# Patient Record
Sex: FEMALE | Race: WHITE | Employment: OTHER | URBAN - METROPOLITAN AREA
[De-identification: names, ages, dates, MRNs, and addresses within clinical notes are randomized per-mention and may not be internally consistent; named-entity substitution may affect disease eponyms.]

---

## 2017-08-14 PROBLEM — S72.142D CLOSED DISPLACED INTERTROCHANTERIC FRACTURE OF LEFT FEMUR WITH ROUTINE HEALING, SUBSEQUENT ENCOUNTER: Status: ACTIVE | Noted: 2017-08-14

## 2017-08-14 PROBLEM — M25.562 ACUTE PAIN OF LEFT KNEE: Status: ACTIVE | Noted: 2017-08-14

## 2017-09-11 PROBLEM — Z96.652 STATUS POST TOTAL LEFT KNEE REPLACEMENT: Status: ACTIVE | Noted: 2017-09-11

## 2019-04-22 ENCOUNTER — HOSPITAL ENCOUNTER (EMERGENCY)
Facility: HOSPITAL | Age: 84
Discharge: HOME | End: 2019-04-22
Attending: EMERGENCY MEDICINE
Payer: MEDICARE

## 2019-04-22 VITALS
SYSTOLIC BLOOD PRESSURE: 149 MMHG | RESPIRATION RATE: 18 BRPM | HEART RATE: 95 BPM | TEMPERATURE: 97 F | OXYGEN SATURATION: 98 % | BODY MASS INDEX: 22.2 KG/M2 | HEIGHT: 64 IN | WEIGHT: 130 LBS | DIASTOLIC BLOOD PRESSURE: 84 MMHG

## 2019-04-22 DIAGNOSIS — N30.00 ACUTE CYSTITIS WITHOUT HEMATURIA: Primary | ICD-10-CM

## 2019-04-22 DIAGNOSIS — I10 HYPERTENSION, UNSPECIFIED TYPE: ICD-10-CM

## 2019-04-22 PROCEDURE — 99284 EMERGENCY DEPT VISIT MOD MDM: CPT

## 2019-04-22 PROCEDURE — 87088 URINE BACTERIA CULTURE: CPT | Performed by: EMERGENCY MEDICINE

## 2019-04-22 PROCEDURE — 96361 HYDRATE IV INFUSION ADD-ON: CPT

## 2019-04-22 PROCEDURE — 87186 SC STD MICRODIL/AGAR DIL: CPT | Performed by: EMERGENCY MEDICINE

## 2019-04-22 PROCEDURE — 85025 COMPLETE CBC W/AUTO DIFF WBC: CPT | Performed by: EMERGENCY MEDICINE

## 2019-04-22 PROCEDURE — 87086 URINE CULTURE/COLONY COUNT: CPT | Performed by: EMERGENCY MEDICINE

## 2019-04-22 PROCEDURE — 81001 URINALYSIS AUTO W/SCOPE: CPT | Performed by: EMERGENCY MEDICINE

## 2019-04-22 PROCEDURE — 96374 THER/PROPH/DIAG INJ IV PUSH: CPT

## 2019-04-22 PROCEDURE — 80053 COMPREHEN METABOLIC PANEL: CPT | Performed by: EMERGENCY MEDICINE

## 2019-04-22 RX ORDER — LISINOPRIL 5 MG/1
5 TABLET ORAL DAILY
Qty: 30 TABLET | Refills: 0 | Status: SHIPPED | OUTPATIENT
Start: 2019-04-22 | End: 2019-07-16

## 2019-04-22 RX ORDER — ONDANSETRON 4 MG/1
4 TABLET, ORALLY DISINTEGRATING ORAL EVERY 4 HOURS PRN
Qty: 10 TABLET | Refills: 0 | Status: SHIPPED | OUTPATIENT
Start: 2019-04-22 | End: 2019-04-29

## 2019-04-22 RX ORDER — SULFAMETHOXAZOLE AND TRIMETHOPRIM 800; 160 MG/1; MG/1
1 TABLET ORAL 2 TIMES DAILY
Qty: 14 TABLET | Refills: 0 | Status: SHIPPED | OUTPATIENT
Start: 2019-04-22 | End: 2019-04-29

## 2019-04-22 RX ORDER — HYDRALAZINE HYDROCHLORIDE 20 MG/ML
10 INJECTION INTRAMUSCULAR; INTRAVENOUS ONCE
Status: COMPLETED | OUTPATIENT
Start: 2019-04-22 | End: 2019-04-22

## 2019-04-22 RX ORDER — SODIUM CHLORIDE 9 MG/ML
INJECTION, SOLUTION INTRAVENOUS CONTINUOUS
Status: DISCONTINUED | OUTPATIENT
Start: 2019-04-22 | End: 2019-04-22

## 2019-04-22 NOTE — ED INITIAL ASSESSMENT (HPI)
Pt from [facility] here with nausea and vomiting which started during noc, sudden onset. No diarrhea. EMS gave zofran in route and pt's nausea now improved.   Arrives alert and oriented

## 2019-04-22 NOTE — ED PROVIDER NOTES
Patient Seen in: [facility] Emergency Department    History   Patient presents with:  Nausea/Vomiting/Diarrhea (gastrointestinal)    Stated Complaint: vomiting    HPI    80-year-old female comes to the hospital complaint of having difficulty with na 04/22/19 0956 Temporal   SpO2 04/22/19 0955 98 %   O2 Device 04/22/19 0955 None (Room air)       Current:/84   Pulse 95   Temp 96.8 °F (36 °C) (Temporal)   Resp 18   Ht 162.6 cm (5' 4\")   Wt 59 kg   SpO2 98%   BMI 22.31 kg/m²         Physical Exam orders.    URINE CULTURE, ROUTINE          Medications   sodium chloride 0.9% IV bolus 500 mL (0 mL Intravenous Stopped 4/22/19 7315)     Followed by   0.9%  NaCl infusion ( Intravenous New Bag 4/22/19 7544)   hydrALAzine HCl (APRESOLINE) injection 10 mg (1

## 2019-04-22 NOTE — ED NOTES
Call placed to [facility] to confirm if pt is taking lisinopril as pt has this listed on her previous med list, but it was not listed on her list form [facility]. Confirmed that pt is not receiving lispinopril.   Dr. Dayana Holland notified

## 2019-04-23 ENCOUNTER — EXTERNAL FACILITY (OUTPATIENT)
Dept: FAMILY MEDICINE CLINIC | Facility: CLINIC | Age: 84
End: 2019-04-23

## 2019-04-23 VITALS
DIASTOLIC BLOOD PRESSURE: 78 MMHG | RESPIRATION RATE: 18 BRPM | OXYGEN SATURATION: 98 % | SYSTOLIC BLOOD PRESSURE: 126 MMHG | TEMPERATURE: 98 F | HEART RATE: 92 BPM

## 2019-04-23 DIAGNOSIS — I10 ESSENTIAL HYPERTENSION, BENIGN: ICD-10-CM

## 2019-04-23 DIAGNOSIS — N30.00 ACUTE CYSTITIS WITHOUT HEMATURIA: Primary | ICD-10-CM

## 2019-04-23 DIAGNOSIS — E11.618 TYPE 2 DIABETES MELLITUS WITH OTHER DIABETIC ARTHROPATHY, WITHOUT LONG-TERM CURRENT USE OF INSULIN (HCC): ICD-10-CM

## 2019-04-23 DIAGNOSIS — N39.41 URGE INCONTINENCE: ICD-10-CM

## 2019-04-23 RX ORDER — POLYETHYLENE GLYCOL 3350 17 G/17G
17 POWDER, FOR SOLUTION ORAL
COMMUNITY
Start: 2019-02-25 | End: 2019-09-11

## 2019-04-23 RX ORDER — ALPRAZOLAM 0.25 MG/1
TABLET ORAL
Refills: 0 | COMMUNITY
Start: 2019-03-07 | End: 2019-05-07

## 2019-04-23 RX ORDER — METRONIDAZOLE 7.5 MG/G
1 GEL TOPICAL
COMMUNITY
Start: 2018-12-27 | End: 2021-02-17 | Stop reason: CLARIF

## 2019-04-23 RX ORDER — FERROUS SULFATE 325(65) MG
325 TABLET ORAL
COMMUNITY
Start: 2019-02-26 | End: 2019-08-20

## 2019-04-23 RX ORDER — TRAMADOL HYDROCHLORIDE 50 MG/1
50 TABLET ORAL EVERY 6 HOURS PRN
Refills: 0 | COMMUNITY
Start: 2019-03-01 | End: 2021-02-17

## 2019-04-23 RX ORDER — DULOXETIN HYDROCHLORIDE 30 MG/1
CAPSULE, DELAYED RELEASE ORAL
COMMUNITY
Start: 2018-12-03 | End: 2019-07-16

## 2019-04-23 NOTE — PROGRESS NOTES
VITALS:  /78   Pulse 92   Temp 97.8 °F (36.6 °C) (Temporal)   Resp 18   SpO2 98%     CC--Patient presents with:  Establish Care  ER F/U: blood pressure       H. P.I Nydia Deleon is a 80year old female who is been seen here for the first time a her crazy, confused    CURRENT MEDICATIONS     Current Outpatient Medications:  denosumab (PROLIA) 60 MG/ML Subcutaneous Solution Inject 60 mg into the skin.  Disp:  Rfl:    Diclofenac Sodium 1 % Transdermal Gel APPLY 4 GRAMS THREE TIMES DAILY TO LEFT KNEE swollen glands. Skin: No rashes, pruritus or skin changes,  Respiratory: Denies cough, wheezing or shortness of breath. CV: Denies chest pain, palpitations, orthopnea, PND or dizziness. Musculoskeletal: No joint pain, stiffness or swelling.   GI: No naus

## 2019-04-25 ENCOUNTER — MED REC SCAN ONLY (OUTPATIENT)
Dept: FAMILY MEDICINE CLINIC | Facility: CLINIC | Age: 84
End: 2019-04-25

## 2019-05-07 ENCOUNTER — EXTERNAL FACILITY (OUTPATIENT)
Dept: FAMILY MEDICINE CLINIC | Facility: CLINIC | Age: 84
End: 2019-05-07

## 2019-05-07 VITALS
OXYGEN SATURATION: 97 % | HEART RATE: 84 BPM | TEMPERATURE: 97 F | SYSTOLIC BLOOD PRESSURE: 118 MMHG | RESPIRATION RATE: 18 BRPM | DIASTOLIC BLOOD PRESSURE: 60 MMHG

## 2019-05-07 DIAGNOSIS — N30.00 ACUTE CYSTITIS WITHOUT HEMATURIA: Primary | ICD-10-CM

## 2019-05-14 NOTE — PROGRESS NOTES
VITALS:  /60   Pulse 84   Temp 96.8 °F (36 °C) (Tympanic)   Resp 18   SpO2 97%     CC--Patient presents with: Follow - Up      H. P.I Fatimah Diaz is a 80year old female   Patient was last seen 2 weeks ago  She was diagnosed with UTI and was 1 CAPSULE BY MOUTH ONCE DAILY Disp:  Rfl:    Ferrous Sulfate 325 (65 Fe) MG Oral Tab Take 325 mg by mouth. Disp:  Rfl:    metroNIDAZOLE 0.75 % External Gel Apply 1 Application topically.  Disp:  Rfl:    Polyethylene Glycol 3350 Oral Powd Pack Take 17 g by m FROM of the back  EXTREMITIES: no cyanosis, clubbing or edema  NEURO: Oriented times three, cranial nerves are intact, motor and sensory are grossly intact    ASSESSMENT AND PLAN:  Diagnoses and all orders for this visit:    Acute cystitis without hematuri

## 2019-06-25 ENCOUNTER — EXTERNAL FACILITY (OUTPATIENT)
Dept: FAMILY MEDICINE CLINIC | Facility: CLINIC | Age: 84
End: 2019-06-25

## 2019-06-25 VITALS
RESPIRATION RATE: 16 BRPM | DIASTOLIC BLOOD PRESSURE: 72 MMHG | HEART RATE: 88 BPM | TEMPERATURE: 97 F | OXYGEN SATURATION: 94 % | SYSTOLIC BLOOD PRESSURE: 118 MMHG

## 2019-06-25 DIAGNOSIS — M25.561 CHRONIC PAIN OF BOTH KNEES: Primary | ICD-10-CM

## 2019-06-25 DIAGNOSIS — M25.562 CHRONIC PAIN OF BOTH KNEES: Primary | ICD-10-CM

## 2019-06-25 DIAGNOSIS — G89.29 CHRONIC PAIN OF BOTH KNEES: Primary | ICD-10-CM

## 2019-06-25 DIAGNOSIS — G47.00 INSOMNIA, UNSPECIFIED TYPE: ICD-10-CM

## 2019-06-25 DIAGNOSIS — Z91.81 AT RISK FOR FALLING: ICD-10-CM

## 2019-06-25 NOTE — PROGRESS NOTES
VITALS:  /72   Pulse 88   Temp 96.8 °F (36 °C) (Temporal)   Resp 16   SpO2 94%     CC--Patient presents with:  Knee Pain: per patient c/o knee pain in both legs       H. P.I Cornelia Dakins is a 80year old female who has history of osteoarthritis 3/21/12    BCC nod / R parietal scalp / Mohs surgery by Dr. Shaina Weston     No family history on file.   Social History    Tobacco Use      Smoking status: Never Smoker      Smokeless tobacco: Never Used    Alcohol use: No      Alcohol/week: 0.0 oz    Drug use: palpitations, orthopnea, PND or dizziness. Musculoskeletal: No joint pain, stiffness or swelling. GI: No nausea, vomiting or diarrhea. No blood in stools. Neurologic: No seizures, tremors, weakness or numbness.     VITALS:  /72   Pulse 88   Temp 96

## 2019-06-26 RX ORDER — ACETAMINOPHEN AND CODEINE PHOSPHATE 300; 30 MG/1; MG/1
1 TABLET ORAL EVERY 4 HOURS PRN
Qty: 40 TABLET | Refills: 0 | Status: SHIPPED | OUTPATIENT
Start: 2019-06-26 | End: 2019-07-23

## 2019-07-16 RX ORDER — ACETAMINOPHEN 325 MG/1
TABLET ORAL
Qty: 180 TABLET | Refills: 0 | Status: SHIPPED | OUTPATIENT
Start: 2019-07-16 | End: 2019-08-13

## 2019-07-16 RX ORDER — LISINOPRIL 5 MG/1
TABLET ORAL
Qty: 90 TABLET | Refills: 0 | Status: SHIPPED | OUTPATIENT
Start: 2019-07-16 | End: 2019-09-16

## 2019-07-16 RX ORDER — MELATONIN
1000 DAILY
Qty: 90 TABLET | Refills: 0 | Status: SHIPPED | OUTPATIENT
Start: 2019-07-16 | End: 2019-09-16

## 2019-07-16 RX ORDER — DULOXETIN HYDROCHLORIDE 30 MG/1
CAPSULE, DELAYED RELEASE ORAL
Qty: 90 CAPSULE | Refills: 0 | Status: SHIPPED | OUTPATIENT
Start: 2019-07-16 | End: 2019-09-16

## 2019-07-22 ENCOUNTER — TELEPHONE (OUTPATIENT)
Dept: FAMILY MEDICINE CLINIC | Facility: CLINIC | Age: 84
End: 2019-07-22

## 2019-07-22 RX ORDER — ATORVASTATIN CALCIUM 40 MG/1
TABLET, FILM COATED ORAL
Qty: 90 TABLET | Refills: 0 | Status: SHIPPED | OUTPATIENT
Start: 2019-07-22 | End: 2019-09-16

## 2019-07-22 NOTE — TELEPHONE ENCOUNTER
Per request of nurse nichole [facility], Rx for Atorvastatin 40 mg nightly sent to [place] [facility]

## 2019-07-23 ENCOUNTER — EXTERNAL FACILITY (OUTPATIENT)
Dept: FAMILY MEDICINE CLINIC | Facility: CLINIC | Age: 84
End: 2019-07-23

## 2019-07-23 VITALS
OXYGEN SATURATION: 97 % | TEMPERATURE: 98 F | SYSTOLIC BLOOD PRESSURE: 130 MMHG | RESPIRATION RATE: 18 BRPM | HEART RATE: 86 BPM | DIASTOLIC BLOOD PRESSURE: 62 MMHG

## 2019-07-23 DIAGNOSIS — G89.29 CHRONIC PAIN OF BOTH KNEES: Primary | ICD-10-CM

## 2019-07-23 DIAGNOSIS — M25.562 CHRONIC PAIN OF BOTH KNEES: Primary | ICD-10-CM

## 2019-07-23 DIAGNOSIS — M25.561 CHRONIC PAIN OF BOTH KNEES: Primary | ICD-10-CM

## 2019-07-23 RX ORDER — BLOOD SUGAR DIAGNOSTIC
STRIP MISCELLANEOUS
Refills: 3 | COMMUNITY
Start: 2019-07-10 | End: 2021-02-17 | Stop reason: CLARIF

## 2019-07-23 RX ORDER — ACETAMINOPHEN AND CODEINE PHOSPHATE 300; 30 MG/1; MG/1
1 TABLET ORAL NIGHTLY
Qty: 30 TABLET | Refills: 0 | Status: SHIPPED | OUTPATIENT
Start: 2019-07-23 | End: 2019-10-14

## 2019-07-23 NOTE — PROGRESS NOTES
VITALS:  /62   Pulse 86   Temp 98 °F (36.7 °C) (Temporal)   Resp 18   SpO2 97%     CC--Patient presents with: Follow - Up: with pain in both knees   Headache      H. P.I Rafat Fuller is a 80year old female who has history of hypertension hype Strip USE 1 STRIP QD.  Disp:  Rfl: 3   atorvastatin 40 MG Oral Tab TAKE ONE TABLET BY MOUTH NIGHTLY Disp: 90 tablet Rfl: 0   lisinopril 5 MG Oral Tab TAKE ONE TABLET BY MOUTH ONCE DAILY Disp: 90 tablet Rfl: 0   DULoxetine HCl 30 MG Oral Cap DR Particles YUAN auscultation  CARDIO: RRR without murmur  GI: good BS's, no masses, HSM or tenderness  MUSCULOSKELETAL: Bilateral knee tenderness  EXTREMITIES: no cyanosis, clubbing or edema  ASSESSMENT AND PLAN:  Diagnoses and all orders for this visit:    Chronic pain o

## 2019-08-01 ENCOUNTER — TELEPHONE (OUTPATIENT)
Dept: FAMILY MEDICINE CLINIC | Facility: CLINIC | Age: 84
End: 2019-08-01

## 2019-08-01 NOTE — TELEPHONE ENCOUNTER
Called [facility] and spoke to Pharmacist who states last night a nurse from [address] [place] [address] called regarding patient's medications. No specific medication was given.      Med list has been reviewed with Dr. Juan Grossman and all medications have been sent to [facility]

## 2019-08-13 RX ORDER — ACETAMINOPHEN 325 MG/1
TABLET ORAL
Qty: 180 TABLET | Refills: 0 | Status: SHIPPED | OUTPATIENT
Start: 2019-08-13 | End: 2019-09-16

## 2019-08-13 NOTE — TELEPHONE ENCOUNTER
Eulalia conklin   acetaminophen 325 MG Oral Tab          Sig: Take 2 tablets (650 mg total) by mouth every 4 hours as needed for pain.     Disp:  180 tablet    Refills:  0    Start: 8/13/2019    Class: Normal    Non-formulary     LOV 7/23/19     LAST LAB n/a

## 2019-08-15 RX ORDER — GABAPENTIN 100 MG/1
100 CAPSULE ORAL 2 TIMES DAILY
Qty: 180 CAPSULE | Refills: 1 | Status: SHIPPED | OUTPATIENT
Start: 2019-08-15 | End: 2019-09-16

## 2019-08-15 NOTE — TELEPHONE ENCOUNTER
GABAPENTIN 100 MG CAPS 03/26/2019 20/19/2635 500 mg 60 applicator 80 30 Tatyana Allen     Per [facility], pt should be on gabapentin BID but NOT on our med list!    Dr Max Priest did you want to order this? Are you supposed to be listed as PCP?     Last

## 2019-08-20 ENCOUNTER — TELEPHONE (OUTPATIENT)
Dept: FAMILY MEDICINE CLINIC | Facility: CLINIC | Age: 84
End: 2019-08-20

## 2019-08-20 RX ORDER — FERROUS SULFATE 325(65) MG
325 TABLET ORAL
Refills: 0 | Status: CANCELLED | OUTPATIENT
Start: 2019-08-20

## 2019-08-20 RX ORDER — ASPIRIN 81 MG
100 TABLET, DELAYED RELEASE (ENTERIC COATED) ORAL 2 TIMES DAILY
Qty: 60 TABLET | Refills: 3 | Status: SHIPPED | OUTPATIENT
Start: 2019-08-20 | End: 2019-09-16

## 2019-08-20 RX ORDER — FERROUS SULFATE 325(65) MG
325 TABLET ORAL
Qty: 30 TABLET | Refills: 3 | Status: SHIPPED | OUTPATIENT
Start: 2019-08-20 | End: 2019-09-16

## 2019-08-20 RX ORDER — GLUCOSAMINE/D3/BOSWELLIA SERRA 1500MG-400
1 TABLET ORAL DAILY
Qty: 30 TABLET | Refills: 3 | Status: SHIPPED | OUTPATIENT
Start: 2019-08-20 | End: 2019-09-16

## 2019-09-03 NOTE — TELEPHONE ENCOUNTER
Diclofenac Sodium 1 % Transdermal Gel          Sig: APPLY 4 GRAMS THREE TIMES DAILY TO LEFT KNEE AND RIGHT SHOULDER    Disp:  Not specified    Refills:  0    Start: 9/3/2019    Class: Normal    Non-formulary    Last ordered: 4 months ago by Reported Extern

## 2019-09-16 RX ORDER — ACETAMINOPHEN 325 MG/1
TABLET ORAL
Qty: 180 TABLET | Refills: 3 | Status: SHIPPED | OUTPATIENT
Start: 2019-09-16 | End: 2020-08-10

## 2019-09-16 RX ORDER — MELATONIN
1000 DAILY
Qty: 90 TABLET | Refills: 3 | Status: SHIPPED | OUTPATIENT
Start: 2019-09-16 | End: 2021-11-22

## 2019-09-16 RX ORDER — MELATONIN
Qty: 90 TABLET | Refills: 3 | Status: SHIPPED | OUTPATIENT
Start: 2019-09-16 | End: 2020-09-14

## 2019-09-16 RX ORDER — GLUCOSAMINE/D3/BOSWELLIA SERRA 1500MG-400
1 TABLET ORAL DAILY
Qty: 90 TABLET | Refills: 3 | Status: SHIPPED | OUTPATIENT
Start: 2019-09-16 | End: 2020-12-29

## 2019-09-16 RX ORDER — GABAPENTIN 100 MG/1
100 CAPSULE ORAL 2 TIMES DAILY
Qty: 180 CAPSULE | Refills: 3 | Status: SHIPPED | OUTPATIENT
Start: 2019-09-16 | End: 2021-11-22

## 2019-09-16 RX ORDER — ONDANSETRON 4 MG/1
4 TABLET, FILM COATED ORAL EVERY 4 HOURS PRN
Qty: 30 TABLET | Refills: 0 | Status: SHIPPED | OUTPATIENT
Start: 2019-09-16

## 2019-09-16 RX ORDER — ASPIRIN 81 MG
100 TABLET, DELAYED RELEASE (ENTERIC COATED) ORAL 2 TIMES DAILY
Qty: 60 TABLET | Refills: 3 | Status: SHIPPED | OUTPATIENT
Start: 2019-09-16 | End: 2020-01-10

## 2019-09-16 RX ORDER — ATORVASTATIN CALCIUM 40 MG/1
TABLET, FILM COATED ORAL
Qty: 90 TABLET | Refills: 3 | Status: SHIPPED | OUTPATIENT
Start: 2019-09-16 | End: 2020-10-30

## 2019-09-16 RX ORDER — MULTIVIT-MIN/FA/LYCOPEN/LUTEIN .4-300-25
1 TABLET ORAL DAILY
Qty: 90 TABLET | Refills: 3 | Status: SHIPPED | OUTPATIENT
Start: 2019-09-16 | End: 2021-03-15

## 2019-09-16 RX ORDER — LISINOPRIL 5 MG/1
TABLET ORAL
Qty: 90 TABLET | Refills: 3 | Status: SHIPPED | OUTPATIENT
Start: 2019-09-16 | End: 2020-07-14

## 2019-09-16 RX ORDER — POLYETHYLENE GLYCOL 3350 17 G/17G
17 POWDER, FOR SOLUTION ORAL DAILY PRN
Qty: 30 EACH | Refills: 3 | Status: SHIPPED | OUTPATIENT
Start: 2019-09-16

## 2019-09-16 RX ORDER — DIPHENHYDRAMINE HCL 25 MG
25 CAPSULE ORAL NIGHTLY PRN
Qty: 30 CAPSULE | Refills: 3 | Status: ON HOLD | OUTPATIENT
Start: 2019-09-16 | End: 2021-02-18

## 2019-09-16 RX ORDER — DULOXETIN HYDROCHLORIDE 30 MG/1
CAPSULE, DELAYED RELEASE ORAL
Qty: 90 CAPSULE | Refills: 3 | Status: SHIPPED | OUTPATIENT
Start: 2019-09-16 | End: 2021-02-17 | Stop reason: CLARIF

## 2019-09-16 RX ORDER — FERROUS SULFATE 325(65) MG
325 TABLET ORAL
Qty: 90 TABLET | Refills: 3 | Status: SHIPPED | OUTPATIENT
Start: 2019-09-16 | End: 2020-09-04

## 2019-09-23 ENCOUNTER — TELEPHONE (OUTPATIENT)
Dept: FAMILY MEDICINE CLINIC | Facility: CLINIC | Age: 84
End: 2019-09-23

## 2019-09-25 ENCOUNTER — MED REC SCAN ONLY (OUTPATIENT)
Dept: FAMILY MEDICINE CLINIC | Facility: CLINIC | Age: 84
End: 2019-09-25

## 2019-10-15 RX ORDER — ACETAMINOPHEN AND CODEINE PHOSPHATE 300; 30 MG/1; MG/1
TABLET ORAL
Qty: 30 TABLET | Refills: 0 | Status: SHIPPED | OUTPATIENT
Start: 2019-10-15 | End: 2019-12-17

## 2019-10-15 NOTE — TELEPHONE ENCOUNTER
LOV 7/19    LAST LAB    LAST RX   Acetaminophen-Codeine (TYLENOL WITH CODEINE #3) 300-30 MG Oral Tab 30 tablet 0 7/23/2019         Next OV    PROTOCOL  Please advise. No protocol. If filled. Please close.    Thank You

## 2019-12-17 RX ORDER — ACETAMINOPHEN AND CODEINE PHOSPHATE 300; 30 MG/1; MG/1
TABLET ORAL
Qty: 30 TABLET | Refills: 0 | Status: SHIPPED | OUTPATIENT
Start: 2019-12-17 | End: 2021-02-17 | Stop reason: CLARIF

## 2019-12-17 NOTE — TELEPHONE ENCOUNTER
Requested Prescriptions     Pending Prescriptions Disp Refills   • Acetaminophen-Codeine #3 300-30 MG Oral Tab 30 tablet 0     Sig: TAKE 1 TABLET BY MOUTH AT BEDTIME

## 2020-01-08 NOTE — TELEPHONE ENCOUNTER
LOV 7/23/19      LAST LAB none    LAST RX 9/16/19 60 tablet 3 refills    Next OV No future appointments.       PROTOCOL none       Name from pharmacy: [address] (DOCUSATE SODIUM) 100MG CAPS         Will file in chart as:  MG Oral Cap         Sig: TAKE 1 TA

## 2020-01-09 RX ORDER — LISINOPRIL 5 MG/1
TABLET ORAL
Qty: 90 TABLET | Refills: 3 | OUTPATIENT
Start: 2020-01-09

## 2020-01-09 NOTE — TELEPHONE ENCOUNTER
LOV 7/19    LAST LAB 4/19    LAST RX   lisinopril 5 MG Oral Tab 90 tablet 3 9/16/2019    Sig:   TAKE ONE TABLET BY MOUTH ONCE DAILY           Next OV Visit date not found      PROTOCOL    Hypertension Medications Protocol Failed1/8 2:43 PM   Appointment in

## 2020-01-10 RX ORDER — DOCUSATE SODIUM 100 MG/1
CAPSULE, LIQUID FILLED ORAL
Qty: 60 CAPSULE | Refills: 0 | Status: SHIPPED | OUTPATIENT
Start: 2020-01-10 | End: 2020-06-11

## 2020-03-04 LAB
AMB EXT CREATININE: 0.84 MG/DL
AMB EXT GFR: 81
AMB EXT GLUCOSE: 88 MG/DL
AMB EXT MALB/CRE CALC: 17.9 UG/MG

## 2020-03-05 ENCOUNTER — TELEPHONE (OUTPATIENT)
Dept: FAMILY MEDICINE CLINIC | Facility: CLINIC | Age: 85
End: 2020-03-05

## 2020-03-09 NOTE — TELEPHONE ENCOUNTER
LOV 7/19    LAST LAB    LAST RX   metFORMIN HCl 500 MG Oral Tab 180 tablet 1 9/11/2019         Next OV Visit date not found      PROTOCOL    Diabetic Medication Protocol Failed3/6 10:07 AM   HgBA1C procedure resulted in past 6 months    Last HgBA1C < 7.5

## 2020-03-30 NOTE — TELEPHONE ENCOUNTER
LOV Visit date not found    LAST LAB none    LAST RX   Menthol, Topical Analgesic, (BIOFREEZE) 4 % External Gel 1 Tube 0 3/11/2020    Sig:   Apply 1 Application topically 3 (three) times daily.  Apply thin film to left knee and right shoulder     Route:   E

## 2020-06-11 RX ORDER — DOCUSATE SODIUM 100 MG/1
CAPSULE, LIQUID FILLED ORAL
Qty: 60 CAPSULE | Refills: 0 | Status: SHIPPED | OUTPATIENT
Start: 2020-06-11 | End: 2020-07-14

## 2020-06-11 NOTE — TELEPHONE ENCOUNTER
LOV Visit date not found    LAST LAB    LAST RX 1-10-20 60*0    Next OV No future appointments.     PROTOCOL   Name from pharmacy: [address] (DOCUSATE SODIUM) 100MG CAPS         Will file in chart as:  MG Oral Cap         Sig: TAKE 1 CAPSULE BY MOUTH TWICE

## 2020-06-15 RX ORDER — CALCIUM CARBONATE/VITAMIN D3 500-3.125
TABLET ORAL
Qty: 60 TABLET | Refills: 0 | Status: SHIPPED | OUTPATIENT
Start: 2020-06-15 | End: 2021-02-17 | Stop reason: CLARIF

## 2020-06-15 NOTE — TELEPHONE ENCOUNTER
LOV Visit date not found    LAST LAB    LAST RX    Next OV No future appointments.     PROTOCOL  Name from pharmacy: OS-EFREM 500 + D3 TABLETS          Will file in chart as: OS-EFREM CALCIUM + D3 500-200 MG-UNIT Oral Tab    The source prescription was reordere

## 2020-06-16 NOTE — TELEPHONE ENCOUNTER
Requested Prescriptions     Pending Prescriptions Disp Refills   • Menthol, Topical Analgesic, (BIOFREEZE) 4 % External Gel 1 Tube 2     Sig: Apply 1 Application topically 3 (three) times daily.  Apply thin film to left knee and right shoulder     Menthol,

## 2020-07-14 RX ORDER — DOCUSATE SODIUM 100 MG/1
100 CAPSULE, LIQUID FILLED ORAL 2 TIMES DAILY
Qty: 60 CAPSULE | Refills: 0 | Status: SHIPPED | OUTPATIENT
Start: 2020-07-14 | End: 2020-08-17

## 2020-07-14 NOTE — TELEPHONE ENCOUNTER
Hypertension Medications Protocol Failed7/14 3:14 PM   Appointment in past 6 or next 3 months    CMP or BMP in past 12 months    Last serum creatinine< 2.0     LOV 7/23/2019      LAST LAB  3/4/20     LAST RX   9/16/19 90 with 3 refills     Next OV No futur

## 2020-07-14 NOTE — TELEPHONE ENCOUNTER
Requested Prescriptions     Pending Prescriptions Disp Refills   • docusate sodium (DOK) 100 MG Oral Cap 60 capsule 0     Sig: Take 1 capsule (100 mg total) by mouth 2 (two) times daily.      LOV 07/23/2019 seen at [address] [facility] E 100 MG Or

## 2020-07-15 RX ORDER — LISINOPRIL 5 MG/1
TABLET ORAL
Qty: 90 TABLET | Refills: 3 | Status: SHIPPED | OUTPATIENT
Start: 2020-07-15 | End: 2021-02-17 | Stop reason: CLARIF

## 2020-08-10 RX ORDER — ACETAMINOPHEN 325 MG/1
TABLET ORAL
Qty: 180 TABLET | Refills: 3 | Status: SHIPPED | OUTPATIENT
Start: 2020-08-10 | End: 2020-11-24

## 2020-08-10 NOTE — TELEPHONE ENCOUNTER
LOV Visit date not found    LAST LAB    LAST RX 9-16-19 180*3    Next OV No future appointments.     PROTOCOL   Name from pharmacy: ACETAMINOPHEN [address]         Will file in chart as: ACETAMINOPHEN 325 MG Oral Tab         Sig: TAKE 2 TABLETS(650 MG)

## 2020-08-17 RX ORDER — DOCUSATE SODIUM 100 MG/1
100 CAPSULE, LIQUID FILLED ORAL 2 TIMES DAILY
Qty: 60 CAPSULE | Refills: 2 | Status: SHIPPED | OUTPATIENT
Start: 2020-08-17 | End: 2021-02-17 | Stop reason: CLARIF

## 2020-08-17 NOTE — TELEPHONE ENCOUNTER
Requested Prescriptions     Pending Prescriptions Disp Refills   • docusate sodium (DOK) 100 MG Oral Cap 60 capsule 2     Sig: Take 1 capsule (100 mg total) by mouth 2 (two) times daily.      Eul[facility]     Protocol none   Please advise

## 2020-09-04 RX ORDER — LIDOCAINE HYDROCHLORIDE 20 MG/ML
SOLUTION ORAL; TOPICAL
Qty: 90 TABLET | Refills: 3 | Status: SHIPPED | OUTPATIENT
Start: 2020-09-04 | End: 2021-02-17 | Stop reason: CLARIF

## 2020-09-04 NOTE — TELEPHONE ENCOUNTER
LOV Visit date not found    LAST LAB    LAST RX 9-16-19 90*3    Next OV No future appointments.     PROTOCOL  Name from pharmacy: FERROUS SULFATE 325MG (5GR) TABS          Will file in chart as: FEROSUL 325 (65 Fe) MG Oral Tab         Sig: TAKE 1 TABLET(325

## 2020-09-14 RX ORDER — MELATONIN
Qty: 120 TABLET | Refills: 0 | Status: SHIPPED | OUTPATIENT
Start: 2020-09-14 | End: 2021-02-17 | Stop reason: CLARIF

## 2020-09-14 NOTE — TELEPHONE ENCOUNTER
Name from pharmacy: [address] [place] [address] Paul Mark Boyle          Will file in chart as: MELATONIN 3 MG Oral Tab         Sig: TAKE 2 TABLETS BY MOUTH AT BEDTIME    Disp:  120 tablet    Refills:  0 (Pharmacy requested: Not specified)    Start: 9/13/2020    Class: Normal

## 2020-09-17 NOTE — TELEPHONE ENCOUNTER
LOV 7/23/2019     LAST LAB 5/3/2019    LAST RX 3/9/2020 180 tablet 1 refill    Next OV No future appointments.       PROTOCOLDiabetic Medication Protocol Failed9/17 9:56 AM   HgBA1C procedure resulted in past 6 months    Last HgBA1C < 7.5    Appointment in

## 2020-10-13 RX ORDER — LISINOPRIL 5 MG/1
TABLET ORAL
Qty: 90 TABLET | Refills: 3 | OUTPATIENT
Start: 2020-10-13

## 2020-10-13 NOTE — TELEPHONE ENCOUNTER
LOV 7-23-19    LAST LAB 4-22-19    LAST RX    Next OV No future appointments.     PROTOCOL    Name from pharmacy: LISINOPRIL 5MG TABLETS          Will file in chart as: LISINOPRIL 5 MG Oral Tab    Sig: TAKE 1 TABLET BY MOUTH EVERY DAY    Disp:  90 tablet

## 2020-10-20 RX ORDER — LISINOPRIL 5 MG/1
TABLET ORAL
Qty: 90 TABLET | Refills: 3 | OUTPATIENT
Start: 2020-10-20

## 2020-10-20 NOTE — TELEPHONE ENCOUNTER
Hypertension Medications Protocol Jzxsrk75/19/2020 02:41 PM   Appointment in past 6 or next 3 months Protocol Details    CMP or BMP in past 12 months     Last serum creatinine< 2.0      LOV   6/25/19 external visit     LAST LAB    LAST RX  7/15/20 90 with

## 2020-10-20 NOTE — TELEPHONE ENCOUNTER
LOV 7/23/2019 ( EXTERNAL FACILITY)    LAST LAB 3/4/20     LAST RX   Medication Quantity Refills Start End   lisinopril 5 MG Oral Tab 90 tablet 3 7/15/2020    Sig:   TAKE ONE TABLET BY MOUTH ONCE DAILY     Route:   (none)     Order #:   [id number]

## 2020-10-21 RX ORDER — LISINOPRIL 5 MG/1
TABLET ORAL
Qty: 90 TABLET | Refills: 3 | OUTPATIENT
Start: 2020-10-21

## 2020-10-30 RX ORDER — ATORVASTATIN CALCIUM 40 MG/1
TABLET, FILM COATED ORAL
Qty: 90 TABLET | Refills: 3 | Status: SHIPPED | OUTPATIENT
Start: 2020-10-30 | End: 2021-02-17 | Stop reason: CLARIF

## 2020-10-30 NOTE — TELEPHONE ENCOUNTER
LOV 7/23/2019    LAST LAB 3/4/2020    LAST RX   atorvastatin 40 MG Oral Tab 90 tablet 3 9/16/2019         Next OV   Future Appointments   Date Time Provider Fátima Bucio   11/6/2020  2:30 PM Andra Alvarez MD [facility] [address]

## 2020-11-24 RX ORDER — ACETAMINOPHEN 325 MG/1
TABLET ORAL
Qty: 180 TABLET | Refills: 3 | Status: SHIPPED | OUTPATIENT
Start: 2020-11-24 | End: 2021-02-17 | Stop reason: CLARIF

## 2020-11-24 NOTE — TELEPHONE ENCOUNTER
LOV 7/23/2019    LAST LAB n/a    LAST RX   acetaminophen 325 MG Oral Tab 180 tablet 3 8/10/2020         Next OV   Future Appointments   Date Time Provider Fátima Rupinder   12/9/2020  2:30 PM Robby Rodriguez MD [facility]         PROTOCOL  n/a

## 2020-12-10 PROBLEM — M70.61 TROCHANTERIC BURSITIS OF RIGHT HIP: Status: ACTIVE | Noted: 2020-12-10

## 2020-12-28 RX ORDER — BIOTIN 10000 MCG
CAPSULE ORAL
Qty: 120 CAPSULE | Refills: 0 | Status: CANCELLED | OUTPATIENT
Start: 2020-12-28

## 2020-12-29 RX ORDER — BIOTIN 10000 MCG
CAPSULE ORAL
Qty: 120 CAPSULE | Refills: 0 | Status: SHIPPED | OUTPATIENT
Start: 2020-12-29 | End: 2021-02-17 | Stop reason: CLARIF

## 2020-12-29 NOTE — TELEPHONE ENCOUNTER
LOV     LAST LAB    LAST RX   Biotin 85936 MCG Oral Tab (Discontinued) 30 tablet 3 8/20/2019         Next OV No future appointments.       PROTOCOL n/a

## 2021-01-07 RX ORDER — BIOTIN 10000 MCG
CAPSULE ORAL
Qty: 120 CAPSULE | Refills: 0 | OUTPATIENT
Start: 2021-01-07

## 2021-01-07 NOTE — TELEPHONE ENCOUNTER
LOV Visit date not found    LAST LAB    LAST RX    Next OV No future appointments.     PROTOCOL  Name from pharmacy: BIOTIN [id number] Lele Carlos [address]          Will file in chart as: BIOTIN 10 MG Oral Cap    Sig: TAKE 1 TABLET BY MOUTH EVERY DAY    Disp:  120 capsul

## 2021-01-11 RX ORDER — BIOTIN 10000 MCG
CAPSULE ORAL
Qty: 120 CAPSULE | Refills: 0 | OUTPATIENT
Start: 2021-01-11

## 2021-01-11 NOTE — TELEPHONE ENCOUNTER
Name from pharmacy: BIOTIN [id number] Lele [address]          Will file in chart as: BIOTIN 10 MG Oral Cap     Possible duplicate: Robb to review recent actions on this medication    Sig: TAKE 1 TABLET BY MOUTH EVERY DAY    Disp:  120 capsule    Refills:  0 (Phar

## 2021-02-17 ENCOUNTER — APPOINTMENT (OUTPATIENT)
Dept: ULTRASOUND IMAGING | Facility: HOSPITAL | Age: 86
End: 2021-02-17
Attending: INTERNAL MEDICINE
Payer: MEDICARE

## 2021-02-17 ENCOUNTER — HOSPITAL ENCOUNTER (OUTPATIENT)
Facility: HOSPITAL | Age: 86
Setting detail: OBSERVATION
Discharge: NURSING, REHAB OR LONG-TERM CARE | End: 2021-02-23
Attending: EMERGENCY MEDICINE | Admitting: HOSPITALIST
Payer: MEDICARE

## 2021-02-17 ENCOUNTER — APPOINTMENT (OUTPATIENT)
Dept: CT IMAGING | Facility: HOSPITAL | Age: 86
End: 2021-02-17
Attending: EMERGENCY MEDICINE
Payer: MEDICARE

## 2021-02-17 ENCOUNTER — APPOINTMENT (OUTPATIENT)
Dept: GENERAL RADIOLOGY | Facility: HOSPITAL | Age: 86
End: 2021-02-17
Attending: EMERGENCY MEDICINE
Payer: MEDICARE

## 2021-02-17 DIAGNOSIS — S00.03XA CONTUSION OF SCALP, INITIAL ENCOUNTER: ICD-10-CM

## 2021-02-17 DIAGNOSIS — I48.92 ATRIAL FLUTTER WITH RAPID VENTRICULAR RESPONSE (HCC): Primary | ICD-10-CM

## 2021-02-17 LAB
ALBUMIN SERPL-MCNC: 3.4 G/DL (ref 3.4–5)
ALBUMIN/GLOB SERPL: 1 {RATIO} (ref 1–2)
ALP LIVER SERPL-CCNC: 92 U/L
ALT SERPL-CCNC: 16 U/L
ANION GAP SERPL CALC-SCNC: 6 MMOL/L (ref 0–18)
APTT PPP: 103.5 SECONDS (ref 25.4–36.1)
APTT PPP: 31.1 SECONDS (ref 25.4–36.1)
AST SERPL-CCNC: 13 U/L (ref 15–37)
ATRIAL RATE: 278 BPM
BASOPHILS # BLD AUTO: 0.05 X10(3) UL (ref 0–0.2)
BASOPHILS NFR BLD AUTO: 0.8 %
BILIRUB SERPL-MCNC: 0.9 MG/DL (ref 0.1–2)
BUN BLD-MCNC: 19 MG/DL (ref 7–18)
BUN/CREAT SERPL: 18.3 (ref 10–20)
CALCIUM BLD-MCNC: 9.4 MG/DL (ref 8.5–10.1)
CHLORIDE SERPL-SCNC: 105 MMOL/L (ref 98–112)
CO2 SERPL-SCNC: 28 MMOL/L (ref 21–32)
CREAT BLD-MCNC: 1.04 MG/DL
DEPRECATED RDW RBC AUTO: 43.1 FL (ref 35.1–46.3)
EOSINOPHIL # BLD AUTO: 0.39 X10(3) UL (ref 0–0.7)
EOSINOPHIL NFR BLD AUTO: 5.9 %
ERYTHROCYTE [DISTWIDTH] IN BLOOD BY AUTOMATED COUNT: 12.4 % (ref 11–15)
EST. AVERAGE GLUCOSE BLD GHB EST-MCNC: 197 MG/DL (ref 68–126)
GLOBULIN PLAS-MCNC: 3.3 G/DL (ref 2.8–4.4)
GLUCOSE BLD-MCNC: 170 MG/DL (ref 70–99)
GLUCOSE BLD-MCNC: 235 MG/DL (ref 70–99)
HAV IGM SER QL: 1.7 MG/DL (ref 1.6–2.6)
HBA1C MFR BLD HPLC: 8.5 % (ref ?–5.7)
HCT VFR BLD AUTO: 35.6 %
HGB BLD-MCNC: 11.8 G/DL
IMM GRANULOCYTES # BLD AUTO: 0.02 X10(3) UL (ref 0–1)
IMM GRANULOCYTES NFR BLD: 0.3 %
INR BLD: 1.04 (ref 0.89–1.11)
LYMPHOCYTES # BLD AUTO: 1.21 X10(3) UL (ref 1–4)
LYMPHOCYTES NFR BLD AUTO: 18.2 %
M PROTEIN MFR SERPL ELPH: 6.7 G/DL (ref 6.4–8.2)
MCH RBC QN AUTO: 31.5 PG (ref 26–34)
MCHC RBC AUTO-ENTMCNC: 33.1 G/DL (ref 31–37)
MCV RBC AUTO: 94.9 FL
MONOCYTES # BLD AUTO: 0.5 X10(3) UL (ref 0.1–1)
MONOCYTES NFR BLD AUTO: 7.5 %
NEUTROPHILS # BLD AUTO: 4.48 X10 (3) UL (ref 1.5–7.7)
NEUTROPHILS # BLD AUTO: 4.48 X10(3) UL (ref 1.5–7.7)
NEUTROPHILS NFR BLD AUTO: 67.3 %
NT-PROBNP SERPL-MCNC: 1820 PG/ML (ref ?–450)
OSMOLALITY SERPL CALC.SUM OF ELEC: 298 MOSM/KG (ref 275–295)
PLATELET # BLD AUTO: 169 10(3)UL (ref 150–450)
POTASSIUM SERPL-SCNC: 3.9 MMOL/L (ref 3.5–5.1)
PSA SERPL DL<=0.01 NG/ML-MCNC: 13.9 SECONDS (ref 12.4–14.6)
Q-T INTERVAL: 342 MS
QRS DURATION: 70 MS
QTC CALCULATION (BEZET): 520 MS
R AXIS: -5 DEGREES
RBC # BLD AUTO: 3.75 X10(6)UL
SARS-COV-2 RNA RESP QL NAA+PROBE: NOT DETECTED
SODIUM SERPL-SCNC: 139 MMOL/L (ref 136–145)
T AXIS: -19 DEGREES
T4 FREE SERPL-MCNC: 1 NG/DL (ref 0.8–1.7)
TROPONIN I SERPL-MCNC: <0.045 NG/ML (ref ?–0.04)
TSI SER-ACNC: 2.11 MIU/ML (ref 0.36–3.74)
TSI SER-ACNC: 2.17 MIU/ML (ref 0.36–3.74)
VENTRICULAR RATE: 139 BPM
WBC # BLD AUTO: 6.7 X10(3) UL (ref 4–11)

## 2021-02-17 PROCEDURE — 73502 X-RAY EXAM HIP UNI 2-3 VIEWS: CPT | Performed by: EMERGENCY MEDICINE

## 2021-02-17 PROCEDURE — 93970 EXTREMITY STUDY: CPT | Performed by: INTERNAL MEDICINE

## 2021-02-17 PROCEDURE — 99204 OFFICE O/P NEW MOD 45 MIN: CPT | Performed by: INTERNAL MEDICINE

## 2021-02-17 PROCEDURE — 72125 CT NECK SPINE W/O DYE: CPT | Performed by: EMERGENCY MEDICINE

## 2021-02-17 PROCEDURE — 99220 INITIAL OBSERVATION CARE,LEVL III: CPT | Performed by: HOSPITALIST

## 2021-02-17 PROCEDURE — 71045 X-RAY EXAM CHEST 1 VIEW: CPT | Performed by: EMERGENCY MEDICINE

## 2021-02-17 PROCEDURE — 70450 CT HEAD/BRAIN W/O DYE: CPT | Performed by: EMERGENCY MEDICINE

## 2021-02-17 RX ORDER — MELATONIN
325
COMMUNITY
End: 2021-11-22

## 2021-02-17 RX ORDER — BIOTIN 10 MG
10 TABLET ORAL DAILY
Status: ON HOLD | COMMUNITY
End: 2021-02-18

## 2021-02-17 RX ORDER — LISINOPRIL 5 MG/1
5 TABLET ORAL DAILY
Status: ON HOLD | COMMUNITY
End: 2021-02-23

## 2021-02-17 RX ORDER — ACETAMINOPHEN 325 MG/1
650 TABLET ORAL EVERY 4 HOURS PRN
COMMUNITY

## 2021-02-17 RX ORDER — ACETAMINOPHEN AND CODEINE PHOSPHATE 300; 30 MG/1; MG/1
1 TABLET ORAL EVERY 6 HOURS PRN
Status: ON HOLD | COMMUNITY
End: 2021-02-18

## 2021-02-17 RX ORDER — ONDANSETRON 2 MG/ML
4 INJECTION INTRAMUSCULAR; INTRAVENOUS EVERY 6 HOURS PRN
Status: DISCONTINUED | OUTPATIENT
Start: 2021-02-17 | End: 2021-02-23

## 2021-02-17 RX ORDER — HEPARIN SODIUM AND DEXTROSE 10000; 5 [USP'U]/100ML; G/100ML
INJECTION INTRAVENOUS CONTINUOUS
Status: DISCONTINUED | OUTPATIENT
Start: 2021-02-17 | End: 2021-02-21

## 2021-02-17 RX ORDER — MELATONIN
3 NIGHTLY
COMMUNITY
End: 2021-11-22

## 2021-02-17 RX ORDER — ATORVASTATIN CALCIUM 40 MG/1
40 TABLET, FILM COATED ORAL NIGHTLY
COMMUNITY
End: 2021-06-16 | Stop reason: DRUGHIGH

## 2021-02-17 RX ORDER — MAGNESIUM OXIDE 400 MG (241.3 MG MAGNESIUM) TABLET
400 TABLET ONCE
Status: COMPLETED | OUTPATIENT
Start: 2021-02-17 | End: 2021-02-17

## 2021-02-17 RX ORDER — HEPARIN SODIUM AND DEXTROSE 10000; 5 [USP'U]/100ML; G/100ML
12 INJECTION INTRAVENOUS ONCE
Status: COMPLETED | OUTPATIENT
Start: 2021-02-17 | End: 2021-02-19

## 2021-02-17 RX ORDER — METOCLOPRAMIDE HYDROCHLORIDE 5 MG/ML
5 INJECTION INTRAMUSCULAR; INTRAVENOUS EVERY 8 HOURS PRN
Status: DISCONTINUED | OUTPATIENT
Start: 2021-02-17 | End: 2021-02-18

## 2021-02-17 RX ORDER — DULOXETIN HYDROCHLORIDE 30 MG/1
30 CAPSULE, DELAYED RELEASE ORAL DAILY
Status: DISCONTINUED | OUTPATIENT
Start: 2021-02-17 | End: 2021-02-23

## 2021-02-17 RX ORDER — TRAMADOL HYDROCHLORIDE 50 MG/1
50 TABLET ORAL EVERY 6 HOURS PRN
Status: DISCONTINUED | OUTPATIENT
Start: 2021-02-17 | End: 2021-02-18

## 2021-02-17 RX ORDER — MELATONIN
1000 DAILY
Status: DISCONTINUED | OUTPATIENT
Start: 2021-02-18 | End: 2021-02-23

## 2021-02-17 RX ORDER — DOCUSATE SODIUM 100 MG/1
100 CAPSULE, LIQUID FILLED ORAL EVERY OTHER DAY
COMMUNITY
End: 2021-11-22

## 2021-02-17 RX ORDER — ASPIRIN 81 MG/1
TABLET, CHEWABLE ORAL
Status: DISPENSED
Start: 2021-02-17 | End: 2021-02-18

## 2021-02-17 RX ORDER — IVERMECTIN 10 MG/G
1 CREAM TOPICAL NIGHTLY
COMMUNITY

## 2021-02-17 RX ORDER — MELATONIN
325
Status: DISCONTINUED | OUTPATIENT
Start: 2021-02-18 | End: 2021-02-23

## 2021-02-17 RX ORDER — ASPIRIN 81 MG/1
81 TABLET, CHEWABLE ORAL DAILY
Status: DISCONTINUED | OUTPATIENT
Start: 2021-02-18 | End: 2021-02-23

## 2021-02-17 RX ORDER — DULOXETIN HYDROCHLORIDE 30 MG/1
30 CAPSULE, DELAYED RELEASE ORAL DAILY
COMMUNITY
End: 2021-11-10

## 2021-02-17 RX ORDER — GABAPENTIN 100 MG/1
100 CAPSULE ORAL 2 TIMES DAILY
Status: DISCONTINUED | OUTPATIENT
Start: 2021-02-17 | End: 2021-02-23

## 2021-02-17 RX ORDER — ATORVASTATIN CALCIUM 40 MG/1
40 TABLET, FILM COATED ORAL NIGHTLY
Status: DISCONTINUED | OUTPATIENT
Start: 2021-02-17 | End: 2021-02-23

## 2021-02-17 RX ORDER — VIT A/VIT C/VIT E/ZINC/COPPER 2148-113
1 TABLET ORAL 2 TIMES DAILY
COMMUNITY

## 2021-02-17 RX ORDER — DEXTROSE MONOHYDRATE 25 G/50ML
50 INJECTION, SOLUTION INTRAVENOUS
Status: DISCONTINUED | OUTPATIENT
Start: 2021-02-17 | End: 2021-02-23

## 2021-02-17 RX ORDER — HEPARIN SODIUM 5000 [USP'U]/ML
60 INJECTION INTRAVENOUS; SUBCUTANEOUS ONCE
Status: COMPLETED | OUTPATIENT
Start: 2021-02-17 | End: 2021-02-17

## 2021-02-17 RX ORDER — DOCUSATE SODIUM 100 MG/1
100 CAPSULE, LIQUID FILLED ORAL 2 TIMES DAILY
Status: DISCONTINUED | OUTPATIENT
Start: 2021-02-17 | End: 2021-02-18

## 2021-02-17 RX ORDER — LISINOPRIL 5 MG/1
5 TABLET ORAL DAILY
Status: DISCONTINUED | OUTPATIENT
Start: 2021-02-18 | End: 2021-02-20

## 2021-02-17 RX ORDER — CALCIUM CARBONATE/VITAMIN D3 250-3.125
1 TABLET ORAL 2 TIMES DAILY
Status: DISCONTINUED | OUTPATIENT
Start: 2021-02-17 | End: 2021-02-23

## 2021-02-17 RX ORDER — MELATONIN
3 NIGHTLY
Status: DISCONTINUED | OUTPATIENT
Start: 2021-02-17 | End: 2021-02-23

## 2021-02-17 RX ORDER — POLYETHYLENE GLYCOL 3350 17 G/17G
17 POWDER, FOR SOLUTION ORAL DAILY PRN
Status: DISCONTINUED | OUTPATIENT
Start: 2021-02-17 | End: 2021-02-23

## 2021-02-17 NOTE — ED INITIAL ASSESSMENT (HPI)
A/o x3, on RA, from [facility], Pt reports falling after not locking her wheelchair\"Getting off the toilet going to wheelchair and I fell,\" did hit her head. Per EMS appeared a-fib in the 12 lead.

## 2021-02-17 NOTE — ED NOTES
Returned from CT with pt and monitor. Straight cath per ER MD orders with no urine return at this time.

## 2021-02-17 NOTE — CONSULTS
[facility] Consult Note    Mk Ross Patient Status:  Emergency    10/30/1931 MRN [id number]   Location [address] Attending Francine Nguyễn, [address] [facility] Day # 0 PCP Nathan Garcia MD     HPI:  66-year-old female with BP improved  - Discussed with patient     Mary DILLONS/[facility]    --------------------------------------------------------------------------------------------------------------------------------  ROS 10 systems reviewed, pertinent findings ab

## 2021-02-17 NOTE — ED PROVIDER NOTES
Patient Seen in: [facility] Emergency Department      History   Patient presents with:  Syncope    Stated Complaint: Syncope    HPI/Subjective:   HPI    77-year-old female with history of hypertension, high cholesterol, diabetes mellitus presents em Mucous membranes are slightly dry, oropharynx is clear, uvula midline. Mild tenderness of the posterior scalp. NECK: Cervical collar in place, no midline cervical spine tenderness or step-off. HEART: Tachycardic rate, regular rhythm. No murmur.     LUNG -----------         ------                     CBC W/ DIFFERENTIAL[[id number]]          Abnormal            Final result                 Please view results for these tests on the individual orders.    URINALYSIS WITH CULTURE REFLEX   RUKHSANA PALMA RAI encounter    Disposition:  There is no disposition on file for this visit. There is no disposition time on file for this visit. Follow-up:  No follow-up provider specified.         Medications Prescribed:  Current Discharge Medication List

## 2021-02-17 NOTE — H&P
[facility]  History and Physical     Peterall Jung Patient Status:  Emergency    10/30/1931 MRN [id number]   Location [address] [facility] Attending Shay Odell, [address] [facility] Day # 0 PCP Joelle Lara MD     Chief Complaint: f capsule, Rfl: 0    •  acetaminophen 325 MG Oral Tab, TAKE 2 TABLETS(650 MG) BY MOUTH EVERY 4 HOURS AS NEEDED FOR PAIN, Disp: 180 tablet, Rfl: 3    •  atorvastatin 40 MG Oral Tab, TAKE ONE TABLET BY MOUTH NIGHTLY, Disp: 90 tablet, Rfl: 3    •  metFORMIN HCl Ondansetron HCl (ZOFRAN) 4 mg tablet, Take 1 tablet (4 mg total) by mouth every 4 (four) hours as needed for Nausea., Disp: 30 tablet, Rfl: 0    •  Glucose Blood (PRODIGY NO CODING BLOOD GLUC) In Vitro Strip, USE 1 STRIP QD., Disp: , Rfl: 3    •  denosuma Clearance: 30.3 mL/min (A) (based on SCr of 1.04 mg/dL (H)). Recent Labs   Lab 02/17/21  1111   PTP 13.9   INR 1.04       Recent Labs   Lab 02/17/21  1111   TROP <0.045       Imaging: Imaging data reviewed in Epic. ASSESSMENT / PLAN:     1. ABBY Vasques

## 2021-02-18 ENCOUNTER — APPOINTMENT (OUTPATIENT)
Dept: CV DIAGNOSTICS | Facility: HOSPITAL | Age: 86
End: 2021-02-18
Attending: HOSPITALIST
Payer: MEDICARE

## 2021-02-18 LAB
APTT PPP: 48 SECONDS (ref 25.4–36.1)
APTT PPP: 52.5 SECONDS (ref 25.4–36.1)
APTT PPP: 59.8 SECONDS (ref 25.4–36.1)
BILIRUB UR QL STRIP.AUTO: NEGATIVE
CLARITY UR REFRACT.AUTO: CLEAR
GLUCOSE BLD-MCNC: 152 MG/DL (ref 70–99)
GLUCOSE BLD-MCNC: 173 MG/DL (ref 70–99)
GLUCOSE BLD-MCNC: 192 MG/DL (ref 70–99)
GLUCOSE BLD-MCNC: 194 MG/DL (ref 70–99)
GLUCOSE UR STRIP.AUTO-MCNC: NEGATIVE MG/DL
HAV IGM SER QL: 1.7 MG/DL (ref 1.6–2.6)
KETONES UR STRIP.AUTO-MCNC: 20 MG/DL
NITRITE UR QL STRIP.AUTO: NEGATIVE
PH UR STRIP.AUTO: 8 [PH] (ref 4.5–8)
PROT UR STRIP.AUTO-MCNC: NEGATIVE MG/DL
RBC UR QL AUTO: NEGATIVE
SP GR UR STRIP.AUTO: 1.01 (ref 1–1.03)
TROPONIN I SERPL-MCNC: <0.045 NG/ML (ref ?–0.04)
UROBILINOGEN UR STRIP.AUTO-MCNC: <2 MG/DL

## 2021-02-18 PROCEDURE — 93306 TTE W/DOPPLER COMPLETE: CPT | Performed by: HOSPITALIST

## 2021-02-18 PROCEDURE — 99214 OFFICE O/P EST MOD 30 MIN: CPT | Performed by: INTERNAL MEDICINE

## 2021-02-18 PROCEDURE — 99226 SUBSEQUENT OBSERVATION CARE: CPT | Performed by: HOSPITALIST

## 2021-02-18 RX ORDER — METOPROLOL TARTRATE 50 MG/1
50 TABLET, FILM COATED ORAL
Status: DISCONTINUED | OUTPATIENT
Start: 2021-02-18 | End: 2021-02-23

## 2021-02-18 RX ORDER — MAGNESIUM OXIDE 400 MG (241.3 MG MAGNESIUM) TABLET
400 TABLET ONCE
Status: COMPLETED | OUTPATIENT
Start: 2021-02-18 | End: 2021-02-18

## 2021-02-18 RX ORDER — ACETAMINOPHEN 325 MG/1
650 TABLET ORAL EVERY 4 HOURS PRN
Status: DISCONTINUED | OUTPATIENT
Start: 2021-02-18 | End: 2021-02-23

## 2021-02-18 RX ORDER — WARFARIN SODIUM 5 MG/1
5 TABLET ORAL
Status: COMPLETED | OUTPATIENT
Start: 2021-02-18 | End: 2021-02-18

## 2021-02-18 RX ORDER — METOPROLOL TARTRATE 5 MG/5ML
5 INJECTION INTRAVENOUS EVERY 4 HOURS PRN
Status: DISCONTINUED | OUTPATIENT
Start: 2021-02-18 | End: 2021-02-21

## 2021-02-18 RX ORDER — HALOPERIDOL 5 MG/ML
2 INJECTION INTRAMUSCULAR EVERY 6 HOURS PRN
Status: DISCONTINUED | OUTPATIENT
Start: 2021-02-18 | End: 2021-02-23

## 2021-02-18 NOTE — PLAN OF CARE
Assumed care this am   Pt alert 1-2. Seems to remember where she is and why she is in the hospital this mornings. Towards the evening became more forgetful and agitated. Was trying to climb out of bed and pulling at IV.  Haldol PRN ordered, and seroquel nig

## 2021-02-18 NOTE — PROGRESS NOTES
[facility]  Progress Note     Omar Patch Patient Status:  Observation    10/30/1931 MRN [id number]   [facility] 7NE-A Attending Zbigniew Cody MD   Hosp Day # 0 PCP Radha Dong MD     Chief Complaint: a flutter    S: Patie reviewed in Epic.     Medications:   • magnesium oxide  400 mg Oral Once   • metoprolol Tartrate  50 mg Oral 2x Daily(Beta Blocker)   • aspirin  81 mg Oral Daily   • DULoxetine HCl  30 mg Oral Daily   • gabapentin  100 mg Oral BID   • Calcium Carbonate-Kathy

## 2021-02-18 NOTE — CM/SW NOTE
Pt is an 81 yo female admitted for aflutter. Pt fell at home. Pt lives at [facility]. Per RN at rounds, pt will most likely to return to [facility]. Eliquis price checked for pt - cost will be $485.   Pt can get a 30 day free coupon but

## 2021-02-18 NOTE — PROGRESS NOTES
[facility]  Cardiology Progress Note    Subjective:  No chest pain or shortness of breath.      Objective:  /81 (BP Location: Right arm)   Pulse 86   Temp 97.9 °F (36.6 °C) (Oral)   Resp 15   Ht 5' 3\" (1.6 m)   Wt 132 lb 4.4 oz (60 kg)   SpO2 95 eliquis and stop heparin gtts. · Echo pending    JENNIFER Truong  2/18/2021  9:32 AM    Attending addendum:    S: Patient denies any chest pain or shortness of breath. She also denies any palpitations.      O:  Hemodynamics: Stable, HR improved  PE:

## 2021-02-18 NOTE — PLAN OF CARE
Assumed care of pt. @ 1800  A&Ox4. Atrial flutter. Rhythm controlled. Asymptomatic. No acute neuro changes. Baseline bilateral lower extremity edema noted. RLE 4/5 strength (edema). Dc'd Cardizem per protocol. HR in 60s.    Heparin infusing at 7ml/h

## 2021-02-18 NOTE — PLAN OF CARE
Assumed care at 1. C/o L hip pain, declined pain medication. Heparin gtt infusing per protocol. BLE US completed this evening. Video monitoring in place.       Problem: SAFETY ADULT - FALL  Goal: Free from fall injury  Description: INTERVENTIONS:  -

## 2021-02-19 ENCOUNTER — APPOINTMENT (OUTPATIENT)
Dept: CT IMAGING | Facility: HOSPITAL | Age: 86
End: 2021-02-19
Attending: INTERNAL MEDICINE
Payer: MEDICARE

## 2021-02-19 LAB
APTT PPP: 59 SECONDS (ref 25.4–36.1)
ATRIAL RATE: 267 BPM
GLUCOSE BLD-MCNC: 143 MG/DL (ref 70–99)
GLUCOSE BLD-MCNC: 160 MG/DL (ref 70–99)
GLUCOSE BLD-MCNC: 174 MG/DL (ref 70–99)
GLUCOSE BLD-MCNC: 243 MG/DL (ref 70–99)
HAV IGM SER QL: 1.7 MG/DL (ref 1.6–2.6)
INR BLD: 1.1 (ref 0.89–1.11)
P AXIS: 48 DEGREES
PSA SERPL DL<=0.01 NG/ML-MCNC: 14.5 SECONDS (ref 12.4–14.6)
Q-T INTERVAL: 300 MS
QRS DURATION: 82 MS
QTC CALCULATION (BEZET): 408 MS
R AXIS: 3 DEGREES
T AXIS: -54 DEGREES
VENTRICULAR RATE: 111 BPM

## 2021-02-19 PROCEDURE — 99214 OFFICE O/P EST MOD 30 MIN: CPT | Performed by: NURSE PRACTITIONER

## 2021-02-19 PROCEDURE — 70450 CT HEAD/BRAIN W/O DYE: CPT | Performed by: INTERNAL MEDICINE

## 2021-02-19 PROCEDURE — 99226 SUBSEQUENT OBSERVATION CARE: CPT | Performed by: HOSPITALIST

## 2021-02-19 RX ORDER — DILTIAZEM HYDROCHLORIDE 180 MG/1
180 CAPSULE, EXTENDED RELEASE ORAL DAILY
Status: DISCONTINUED | OUTPATIENT
Start: 2021-02-19 | End: 2021-02-19

## 2021-02-19 RX ORDER — MAGNESIUM OXIDE 400 MG (241.3 MG MAGNESIUM) TABLET
400 TABLET ONCE
Status: DISCONTINUED | OUTPATIENT
Start: 2021-02-19 | End: 2021-02-21

## 2021-02-19 RX ORDER — WARFARIN SODIUM 5 MG/1
5 TABLET ORAL
Status: COMPLETED | OUTPATIENT
Start: 2021-02-19 | End: 2021-02-19

## 2021-02-19 RX ORDER — WARFARIN SODIUM 5 MG/1
5 TABLET ORAL NIGHTLY
Qty: 30 TABLET | Refills: 3 | Status: SHIPPED | OUTPATIENT
Start: 2021-02-19 | End: 2021-02-23

## 2021-02-19 RX ORDER — METOPROLOL TARTRATE 50 MG/1
50 TABLET, FILM COATED ORAL
Qty: 60 TABLET | Refills: 3 | Status: SHIPPED | OUTPATIENT
Start: 2021-02-19 | End: 2021-02-23

## 2021-02-19 RX ORDER — DILTIAZEM HYDROCHLORIDE 60 MG/1
60 TABLET, FILM COATED ORAL EVERY 6 HOURS SCHEDULED
Status: DISCONTINUED | OUTPATIENT
Start: 2021-02-19 | End: 2021-02-20

## 2021-02-19 NOTE — PLAN OF CARE
Assumed care. Patient confused/agitated. Trying to get OOB. HR elevated. Cardiology notified. Prn metoprolol ordered/given for HR sustained >130. Not really effective as when patient is agitated, HR is elevated. When patient is sleeping, HR is 80-90's.  Ignacio Piña Evaluate effectiveness of antiarrhythmic and heart rate control medications as ordered  - Initiate emergency measures for life threatening arrhythmias  - Monitor electrolytes and administer replacement therapy as ordered  Outcome: Progressing

## 2021-02-19 NOTE — DIETARY NOTE
[address] [facility]     Admitting diagnosis:  Atrial flutter with rapid ventricular response ([address] [I48.92]  Contusion of scalp, initial encounter [S00.03XA]    Ht: 160 cm (5' 3\")  Wt: 60 kg (132 lb 4.4 oz).    Body

## 2021-02-19 NOTE — PROGRESS NOTES
[facility]  Progress Note     Fatimah Diaz Patient Status:  Observation    10/30/1931 MRN [id number]   [facility] 7NE-A Attending Elisa Booth MD   Hosp Day # 0 PCP Calos Barnes MD     Chief Complaint: a flutter    S: Patie magnesium oxide  400 mg Oral Once   • dilTIAZem  180 mg Oral Daily   • metoprolol Tartrate  50 mg Oral 2x Daily(Beta Blocker)   • QUEtiapine Fumarate  12.5 mg Oral Nightly   • aspirin  81 mg Oral Daily   • DULoxetine HCl  30 mg Oral Daily   • gabapentin  1

## 2021-02-19 NOTE — PHYSICAL THERAPY NOTE
PT orders received and chart reviewed. Per discussion with RN pt is tachycardic this morning and a stat CT has been ordered for increased confusion. Asked to hold until afternoon. PT spoke with pt's son to obtain history and prior level of function.  Pt

## 2021-02-19 NOTE — PROGRESS NOTES
[facility]  Cardiology Progress Note    Subjective:  No chest pain or shortness of breath.     Objective:  /89 (BP Location: Right arm)   Pulse 104   Temp 98.2 °F (36.8 °C) (Oral)   Resp 14   Ht 5' 3\" (1.6 m)   Wt 132 lb 4.4 oz (60 kg)   SpO2 95

## 2021-02-20 LAB
APTT PPP: 82 SECONDS (ref 25.4–36.1)
GLUCOSE BLD-MCNC: 177 MG/DL (ref 70–99)
GLUCOSE BLD-MCNC: 193 MG/DL (ref 70–99)
GLUCOSE BLD-MCNC: 215 MG/DL (ref 70–99)
HAV IGM SER QL: 1.8 MG/DL (ref 1.6–2.6)
INR BLD: 1.46 (ref 0.89–1.11)
PSA SERPL DL<=0.01 NG/ML-MCNC: 18.1 SECONDS (ref 12.4–14.6)

## 2021-02-20 PROCEDURE — 99214 OFFICE O/P EST MOD 30 MIN: CPT | Performed by: INTERNAL MEDICINE

## 2021-02-20 PROCEDURE — 99226 SUBSEQUENT OBSERVATION CARE: CPT | Performed by: HOSPITALIST

## 2021-02-20 RX ORDER — DILTIAZEM HYDROCHLORIDE 240 MG/1
240 CAPSULE, COATED, EXTENDED RELEASE ORAL DAILY
Status: DISCONTINUED | OUTPATIENT
Start: 2021-02-20 | End: 2021-02-20

## 2021-02-20 RX ORDER — WARFARIN SODIUM 5 MG/1
5 TABLET ORAL
Status: COMPLETED | OUTPATIENT
Start: 2021-02-20 | End: 2021-02-20

## 2021-02-20 RX ORDER — DILTIAZEM HYDROCHLORIDE 120 MG/1
120 CAPSULE, EXTENDED RELEASE ORAL 2 TIMES DAILY
Status: DISCONTINUED | OUTPATIENT
Start: 2021-02-20 | End: 2021-02-23

## 2021-02-20 RX ORDER — MAGNESIUM OXIDE 400 MG (241.3 MG MAGNESIUM) TABLET
400 TABLET ONCE
Status: DISCONTINUED | OUTPATIENT
Start: 2021-02-20 | End: 2021-02-21

## 2021-02-20 RX ORDER — FUROSEMIDE 10 MG/ML
20 INJECTION INTRAMUSCULAR; INTRAVENOUS ONCE
Status: COMPLETED | OUTPATIENT
Start: 2021-02-20 | End: 2021-02-20

## 2021-02-20 NOTE — PLAN OF CARE
Assumed care at 0700  Patient drowsy this am. More alert this afternoon  Confused this am. Oriented x4 this evening  HR 130s at rest. PO cardizem added  Rates better controlled this evening 80s-90s  PT to see tomorrow d/t hr today  Heparin drip infusing  C

## 2021-02-20 NOTE — PLAN OF CARE
Assumed care. Patient drowsy tonight but not as confused. Oriented to person and place, situation. IV heparin infusing per protocol. Coumadin 5mg given tonight. HR better controlled tonight after adding po cardizem. No other issues overnight. Will monitor.

## 2021-02-20 NOTE — PHYSICAL THERAPY NOTE
PHYSICAL THERAPY EVALUATION - INPATIENT     Room Number: 0738/2502-O  Evaluation Date: 2/20/2021  Type of Evaluation: Initial  Physician Order: PT Eval and Treat    Presenting Problem: s/p fall, afib  Reason for Therapy: Mobility Dysfunction and Disc certain about this. Pt has poor safety awareness as she often stand up out of her w/c at home. Per pt reports, pt able to dress herself, and is able to complete bed mobility and transfers without assistance.     SUBJECTIVE  \"I can dress myself and get in AM-PAC Score:  Raw Score: 16   Approx Degree of Impairment: 54.16%   Standardized Score (AM-PAC Scale): 40.78   CMS Modifier (G-Code): CK    FUNCTIONAL ABILITY STATUS  Gait Assessment   Gait Assistance:  Moderate assistance  Distance (ft): 1  Assistiv functional limitations in independent bed mobility, transfers, and gait. The patient is below baseline and would benefit from skilled inpatient PT to address the above deficits to assist patient in returning to prior to level of function.   DISCHARGE RECOM

## 2021-02-20 NOTE — PROGRESS NOTES
[facility]  Progress Note     Bonnie Borja Patient Status:  Observation    10/30/1931 MRN [id number]   [facility] 7NE-A Attending Josy Godoy MD   Hosp Day # 0 PCP Grecia Saldana MD     Chief Complaint: a flutter    S: Patie hours.    Imaging: Imaging data reviewed in Epic.     Medications:   • magnesium oxide  400 mg Oral Once   • dilTIAZem  240 mg Oral Daily   • magnesium oxide  400 mg Oral Once   • metoprolol Tartrate  50 mg Oral 2x Daily(Beta Blocker)   • QUEtiapine Fumarat

## 2021-02-20 NOTE — PROGRESS NOTES
[facility]  Cardiology Progress Note    Subjective:  No chest pain or shortness of breath. Afib/flutter rates better. INR rising. Less confused.     Objective:  /60   Pulse 78   Temp 98.3 °F (36.8 °C) (Oral)   Resp 14   Ht 5' 3\" (1.6 m)   Wt 50mm Hg to 55mm Hg. Estimated pulmonary artery diastolic      pressure was 18mm Hg. 8. Pericardium, extracardiac: A trivial pericardial effusion was identified.      There was no evidence of hemodynamic compromise.      Impressions:  This study is compare

## 2021-02-21 LAB
APTT PPP: 48.9 SECONDS (ref 25.4–36.1)
APTT PPP: 92 SECONDS (ref 25.4–36.1)
CREAT BLD-MCNC: 1.35 MG/DL
GLUCOSE BLD-MCNC: 183 MG/DL (ref 70–99)
GLUCOSE BLD-MCNC: 194 MG/DL (ref 70–99)
GLUCOSE BLD-MCNC: 237 MG/DL (ref 70–99)
HAV IGM SER QL: 1.8 MG/DL (ref 1.6–2.6)
INR BLD: 2.13 (ref 0.89–1.11)
PSA SERPL DL<=0.01 NG/ML-MCNC: 24.4 SECONDS (ref 12.4–14.6)

## 2021-02-21 PROCEDURE — 99214 OFFICE O/P EST MOD 30 MIN: CPT | Performed by: INTERNAL MEDICINE

## 2021-02-21 PROCEDURE — 99225 SUBSEQUENT OBSERVATION CARE: CPT | Performed by: HOSPITALIST

## 2021-02-21 RX ORDER — MAGNESIUM OXIDE 400 MG (241.3 MG MAGNESIUM) TABLET
400 TABLET ONCE
Status: COMPLETED | OUTPATIENT
Start: 2021-02-21 | End: 2021-02-21

## 2021-02-21 RX ORDER — METOPROLOL TARTRATE 5 MG/5ML
5 INJECTION INTRAVENOUS EVERY 4 HOURS PRN
Status: DISCONTINUED | OUTPATIENT
Start: 2021-02-21 | End: 2021-02-23

## 2021-02-21 NOTE — PROGRESS NOTES
[facility]  Cardiology Progress Note    Subjective:  No chest pain or shortness of breath. Very weak. Daughter at bedside. Patient has not been up today except to chair and is still using Purewick.     Objective:  /63 (BP Location: R Hg to 55mm Hg. Estimated pulmonary artery diastolic      pressure was 18mm Hg. 8. Pericardium, extracardiac: A trivial pericardial effusion was identified.      There was no evidence of hemodynamic compromise.      Impressions:  This study is compared wit assessment and plan. Lluvia Ross.  Miriam Worley MD

## 2021-02-21 NOTE — PLAN OF CARE
Assumed care. No new issues overnight. HR's a little higher overnight d/t lopressor being held for lower BP. Patient currently resting in bed. Mentation better. Anticipate discharge soon.

## 2021-02-21 NOTE — PHYSICAL THERAPY NOTE
Duplicate order noted. Will continue to follow pt. Pt has follow up date for PT of 2/22/21. Will re-assess at that time. Thank you.

## 2021-02-21 NOTE — PLAN OF CARE
Assumed care at 0700  Patient alert, oriented x4. Drowsy, forgetful at times  Worked with PT.  Up to chair  PT recommending  PT  Low bp this am. meds adjusted  IV lasix x1  Better appetite today    Possible dc tomorrow  Plan of care discussed with patient

## 2021-02-21 NOTE — PROGRESS NOTES
[facility] HOSPITALIST  Progress Note     Itzel Max Patient Status:  Observation    10/30/1931 MRN [id number]   [facility] 7NE-A Attending Amada Keller MD   Hosp Day # 0 PCP Candie Infante MD     Chief Complaint: a flutter    S: Patie results for input(s): CK in the last 168 hours. Inflammatory Markers  No results for input(s): CRP, FERNANDO, LDH, DDIMER in the last 168 hours. Imaging: Imaging data reviewed in Epic.     Medications:   • dilTIAZem  120 mg Oral BID   • metoprolol Tartrate

## 2021-02-22 LAB
GLUCOSE BLD-MCNC: 190 MG/DL (ref 70–99)
GLUCOSE BLD-MCNC: 220 MG/DL (ref 70–99)
GLUCOSE BLD-MCNC: 246 MG/DL (ref 70–99)
GLUCOSE BLD-MCNC: 287 MG/DL (ref 70–99)
GLUCOSE BLD-MCNC: 308 MG/DL (ref 70–99)
GLUCOSE BLD-MCNC: 414 MG/DL (ref 70–99)
HAV IGM SER QL: 1.9 MG/DL (ref 1.6–2.6)
INR BLD: 2.2 (ref 0.89–1.11)
PSA SERPL DL<=0.01 NG/ML-MCNC: 25 SECONDS (ref 12.4–14.6)

## 2021-02-22 PROCEDURE — 99225 SUBSEQUENT OBSERVATION CARE: CPT | Performed by: HOSPITALIST

## 2021-02-22 PROCEDURE — 99214 OFFICE O/P EST MOD 30 MIN: CPT | Performed by: NURSE PRACTITIONER

## 2021-02-22 RX ORDER — DILTIAZEM HYDROCHLORIDE 120 MG/1
120 CAPSULE, EXTENDED RELEASE ORAL 2 TIMES DAILY
Qty: 60 CAPSULE | Refills: 5 | Status: SHIPPED | OUTPATIENT
Start: 2021-02-22 | End: 2021-02-23

## 2021-02-22 NOTE — CM/SW NOTE
Per PT, pt will need assistance for transfers at [facility] which dtr found out they can provide. PT is also recommending HHPT. Will send referral to MK Sutherland who provides threapy at [facility]. Referral sent via Aidin - waiting for a response.

## 2021-02-22 NOTE — PHYSICAL THERAPY NOTE
PHYSICAL THERAPY TREATMENT NOTE - INPATIENT    Room Number: 6085/5693-B     Session: 1   Number of Visits to Meet Established Goals: 5    Presenting Problem: s/p fall, afib    History related to current admission: pt admitted to Ed from [facility] Standing: Poor +  Dynamic Standing: Poor +    ACTIVITY TOLERANCE   Pt tolerated activity well, no c/o dizziness. Vitals stable.          BP: 107/80  BP Location: Right arm  BP Method: Automatic  Patient Position: Sitting    O2 WALK              AM-PAC '6-Cl sit to/from stand transfers. On the first stand pt stood for 20 seconds, second stand pt marched in place, third and fourth stands pt took several steps forward and back from the chair, and fifth and sixth stands pt ambulated to/from bed to chair.  Pt ambul assistance level: supervision      Goal #3 Patient is able to ambulate 5 feet with assist device: walker - rolling at assistance level: minimum assistance      Goal #4     Goal #5     Goal #6     Goal Comments: Goals established on 2/20/2021, continued 2/2

## 2021-02-22 NOTE — PLAN OF CARE
A/OX2-3, forgetful at times  RAELIAS A.flutter per Tele, HR< 100 majority of the day   Denies pain at this time  PT reeval pending for discharge as pt required more assistance w/ mobility this morning, up to chair and bathroom w/ sit to stand  Incontinent life threatening arrhythmias  - Monitor electrolytes and administer replacement therapy as ordered  Outcome: Progressing

## 2021-02-22 NOTE — PROGRESS NOTES
[facility]  Cardiology Progress Note    Jo-Ann Lee Patient Status:  Observation    10/30/1931 MRN [id number]   [facility] 7NE-A Attending Mirian Gandhi MD   Hosp Day # 0 PCP Ronak Neil MD     Subjective:  Very drowsy sitti Warfarin. INR therapeutic. · Moderate secondary pHTN w/ moderate TR  · Trivial pericardial effusion  · Moderate MR  · HTN - controlled off ACEi, follow  · Acute delerium - improving  · Dyslipidemia  · Osteoarthritis  · Type II DM  · DNR     Plan:   1.  Co

## 2021-02-22 NOTE — PLAN OF CARE
Assumed patient care at [address] [place] [address] to place and time last night. This morning pt alert & oriented x4  Complex neuro exam; not fully participating in assessment at times d/t drowsiness; neurological deficits unchanged  RA.   Tele; Atrial flutter; HR

## 2021-02-22 NOTE — DIETARY NOTE
[facility]    NUTRITION ASSESSMENT    Pt does not meet malnutrition criteria.     NUTRITION DIAGNOSIS/PROBLEM:    Inadequate oral intake related to physiological causes as evidenced by estimated intake less than estimated neds    NUTRITION INTERVENTION exam.    NUTRITION PRESCRIPTION:  Calories: 1449-0743 calories/day (25-30 calories per kg)  Protein: 72 grams protein/day (1.2 grams protein per kg)  Fluid: ~1 ml/kcal or per MD discretion    MONITOR AND EVALUATE/NUTRITION GOALS:    1. PO intake to meet at

## 2021-02-23 VITALS
WEIGHT: 132.25 LBS | HEIGHT: 63 IN | HEART RATE: 81 BPM | RESPIRATION RATE: 15 BRPM | OXYGEN SATURATION: 97 % | DIASTOLIC BLOOD PRESSURE: 60 MMHG | SYSTOLIC BLOOD PRESSURE: 130 MMHG | BODY MASS INDEX: 23.43 KG/M2 | TEMPERATURE: 98 F

## 2021-02-23 LAB
GLUCOSE BLD-MCNC: 201 MG/DL (ref 70–99)
INR BLD: 1.93 (ref 0.89–1.11)
PSA SERPL DL<=0.01 NG/ML-MCNC: 22.6 SECONDS (ref 12.4–14.6)

## 2021-02-23 PROCEDURE — 99217 OBSERVATION CARE DISCHARGE: CPT | Performed by: HOSPITALIST

## 2021-02-23 RX ORDER — DILTIAZEM HYDROCHLORIDE 120 MG/1
120 CAPSULE, EXTENDED RELEASE ORAL 2 TIMES DAILY
Qty: 60 CAPSULE | Refills: 5 | Status: SHIPPED | OUTPATIENT
Start: 2021-02-23 | End: 2021-10-02

## 2021-02-23 RX ORDER — WARFARIN SODIUM 5 MG/1
5 TABLET ORAL ONCE
Status: COMPLETED | OUTPATIENT
Start: 2021-02-23 | End: 2021-02-23

## 2021-02-23 RX ORDER — METOPROLOL TARTRATE 50 MG/1
50 TABLET, FILM COATED ORAL
Qty: 60 TABLET | Refills: 3 | Status: SHIPPED | OUTPATIENT
Start: 2021-02-23 | End: 2021-08-09

## 2021-02-23 RX ORDER — WARFARIN SODIUM 5 MG/1
5 TABLET ORAL NIGHTLY
Qty: 30 TABLET | Refills: 3 | Status: SHIPPED | OUTPATIENT
Start: 2021-02-23 | End: 2021-05-26

## 2021-02-23 NOTE — PLAN OF CARE
A/OX3, forgetful at times  RA, VSS, A.flutter per tele  Denies pain at this time  Pt lethargic early in the morning, woke up throughout the day, seroquel dc  PT recommending home w/ 24hr supervision and  PT, per family [place] able to assist w/ medications as ordered  - Initiate emergency measures for life threatening arrhythmias  - Monitor electrolytes and administer replacement therapy as ordered  Outcome: Progressing

## 2021-02-23 NOTE — PLAN OF CARE
NURSING DISCHARGE NOTE    Discharged Nursing home via Ambulance. Accompanied by Support staff  Belongings Taken by patient/family.     Patient discharge to [facility] assisted living via [place] [id number] ambulance  Report called to Nurse Wagner Casper at [phone]

## 2021-02-23 NOTE — PROGRESS NOTES
[facility] Dosing Service  Warfarin (Coumadin) Initial Dosing    Jo-Ann Lee is a 80year old patient for whom pharmacy has been consulted to dose warfarin (COUMADIN) for Prophylaxis of venous thrombosis by Dr. Rehan Rodriguez.   Based on this indication,

## 2021-02-23 NOTE — CM/SW NOTE
[facility] accepted pt for HH. Told them pt will be d/c'd home back to [facility] today. RN will call report to [phone]. [facility] is scheduled to  pt at 12:30pm.  PCS form completed.

## 2021-02-23 NOTE — CM/SW NOTE
02/23/21 1114   Discharge disposition   Expected discharge disposition [facility]   Name of Facillity/Home Care/Hospice [facility] Provider   [facility] [place])   Home services after discharge Skilled home care   Discharge [place]

## 2021-02-23 NOTE — PLAN OF CARE
Assumed patient care at [facility] oriented x4; Forgetful  Complex neuro exam per protocol; no new neurological changes  Tele; A flutter.   HR 80-90s  Coumadin as scheduled  Denies pain/discomfort  Up to bathroom with sit to stand  Video monitoring and b

## 2021-02-24 ENCOUNTER — PATIENT OUTREACH (OUTPATIENT)
Dept: CASE MANAGEMENT | Age: 86
End: 2021-02-24

## 2021-02-24 ENCOUNTER — NURSE ONLY (OUTPATIENT)
Dept: LAB | Age: 86
End: 2021-02-24
Attending: INTERNAL MEDICINE
Payer: MEDICARE

## 2021-02-24 DIAGNOSIS — N39.0 URINARY TRACT INFECTION, SITE NOT SPECIFIED: Primary | ICD-10-CM

## 2021-02-24 DIAGNOSIS — I48.92 ATRIAL FLUTTER WITH RAPID VENTRICULAR RESPONSE (HCC): ICD-10-CM

## 2021-02-24 PROCEDURE — 87088 URINE BACTERIA CULTURE: CPT

## 2021-02-24 PROCEDURE — 87086 URINE CULTURE/COLONY COUNT: CPT

## 2021-02-24 PROCEDURE — 87186 SC STD MICRODIL/AGAR DIL: CPT

## 2021-02-24 PROCEDURE — 81001 URINALYSIS AUTO W/SCOPE: CPT

## 2021-02-24 NOTE — PROGRESS NOTES
Left message on mailbox for pt to call NCM back for TCM. NCM contact information included in message. Pt follows with Dr. RACHEL SALAMANCA at [facility].

## 2021-02-25 ENCOUNTER — NURSE ONLY (OUTPATIENT)
Dept: LAB | Age: 86
End: 2021-02-25
Attending: NURSE PRACTITIONER
Payer: MEDICARE

## 2021-02-25 ENCOUNTER — ANTI-COAG (OUTPATIENT)
Dept: CARDIOLOGY | Age: 86
End: 2021-02-25

## 2021-02-25 DIAGNOSIS — I48.92 ATRIAL FLUTTER, UNSPECIFIED TYPE (CMD): ICD-10-CM

## 2021-02-25 DIAGNOSIS — I48.92 ATRIAL FLUTTER, UNSPECIFIED TYPE (CMD): Primary | ICD-10-CM

## 2021-02-25 LAB
BILIRUB UR QL STRIP.AUTO: NEGATIVE
COLOR UR AUTO: YELLOW
GLUCOSE UR STRIP.AUTO-MCNC: >=500 MG/DL
INR BLD: 3.14 (ref 0.89–1.11)
INR PPP: 3.14
KETONES UR STRIP.AUTO-MCNC: NEGATIVE MG/DL
NITRITE UR QL STRIP.AUTO: POSITIVE
PH UR STRIP.AUTO: 5 [PH] (ref 5–8)
PROT UR STRIP.AUTO-MCNC: 30 MG/DL
PSA SERPL DL<=0.01 NG/ML-MCNC: 33 SECONDS (ref 12.4–14.6)
RBC UR QL AUTO: NEGATIVE
SP GR UR STRIP.AUTO: 1.02 (ref 1–1.03)
UROBILINOGEN UR STRIP.AUTO-MCNC: <2 MG/DL
WBC #/AREA URNS AUTO: >50 /HPF
WBC CLUMPS UR QL AUTO: PRESENT /HPF

## 2021-02-25 PROCEDURE — 85610 PROTHROMBIN TIME: CPT

## 2021-02-25 NOTE — PROGRESS NOTES
Attempted to reach the patient to complete [facility] FU call. Left a message for the pt to call [facility] back at, [phone].

## 2021-02-26 ENCOUNTER — NURSE ONLY (OUTPATIENT)
Dept: LAB | Age: 86
End: 2021-02-26
Attending: INTERNAL MEDICINE
Payer: MEDICARE

## 2021-02-26 DIAGNOSIS — I48.91 A-FIB (HCC): ICD-10-CM

## 2021-02-26 DIAGNOSIS — E11.9 DIABETES MELLITUS (HCC): Primary | ICD-10-CM

## 2021-02-26 LAB
ALBUMIN SERPL-MCNC: 3.1 G/DL (ref 3.4–5)
ALBUMIN/GLOB SERPL: 0.9 {RATIO} (ref 1–2)
ALP LIVER SERPL-CCNC: 96 U/L
ALT SERPL-CCNC: 18 U/L
ANION GAP SERPL CALC-SCNC: 5 MMOL/L (ref 0–18)
AST SERPL-CCNC: 12 U/L (ref 15–37)
BASOPHILS # BLD AUTO: 0.08 X10(3) UL (ref 0–0.2)
BASOPHILS NFR BLD AUTO: 0.8 %
BILIRUB SERPL-MCNC: 1.2 MG/DL (ref 0.1–2)
BUN BLD-MCNC: 27 MG/DL (ref 7–18)
BUN/CREAT SERPL: 21.3 (ref 10–20)
CALCIUM BLD-MCNC: 10.4 MG/DL (ref 8.5–10.1)
CHLORIDE SERPL-SCNC: 100 MMOL/L (ref 98–112)
CO2 SERPL-SCNC: 31 MMOL/L (ref 21–32)
CREAT BLD-MCNC: 1.27 MG/DL
DEPRECATED RDW RBC AUTO: 45.4 FL (ref 35.1–46.3)
EOSINOPHIL # BLD AUTO: 0.34 X10(3) UL (ref 0–0.7)
EOSINOPHIL NFR BLD AUTO: 3.3 %
ERYTHROCYTE [DISTWIDTH] IN BLOOD BY AUTOMATED COUNT: 12.8 % (ref 11–15)
GLOBULIN PLAS-MCNC: 3.6 G/DL (ref 2.8–4.4)
GLUCOSE BLD-MCNC: 232 MG/DL (ref 70–99)
HCT VFR BLD AUTO: 35.3 %
HGB BLD-MCNC: 11.2 G/DL
IMM GRANULOCYTES # BLD AUTO: 0.06 X10(3) UL (ref 0–1)
IMM GRANULOCYTES NFR BLD: 0.6 %
INR BLD: 2.45 (ref 0.89–1.11)
LYMPHOCYTES # BLD AUTO: 1.7 X10(3) UL (ref 1–4)
LYMPHOCYTES NFR BLD AUTO: 16.7 %
M PROTEIN MFR SERPL ELPH: 6.7 G/DL (ref 6.4–8.2)
MCH RBC QN AUTO: 31.5 PG (ref 26–34)
MCHC RBC AUTO-ENTMCNC: 31.7 G/DL (ref 31–37)
MCV RBC AUTO: 99.2 FL
MONOCYTES # BLD AUTO: 0.72 X10(3) UL (ref 0.1–1)
MONOCYTES NFR BLD AUTO: 7.1 %
NEUTROPHILS # BLD AUTO: 7.29 X10 (3) UL (ref 1.5–7.7)
NEUTROPHILS # BLD AUTO: 7.29 X10(3) UL (ref 1.5–7.7)
NEUTROPHILS NFR BLD AUTO: 71.5 %
OSMOLALITY SERPL CALC.SUM OF ELEC: 295 MOSM/KG (ref 275–295)
PATIENT FASTING Y/N/NP: YES
PLATELET # BLD AUTO: 291 10(3)UL (ref 150–450)
POTASSIUM SERPL-SCNC: 4.2 MMOL/L (ref 3.5–5.1)
PSA SERPL DL<=0.01 NG/ML-MCNC: 27.2 SECONDS (ref 12.4–14.6)
RBC # BLD AUTO: 3.56 X10(6)UL
SODIUM SERPL-SCNC: 136 MMOL/L (ref 136–145)
WBC # BLD AUTO: 10.2 X10(3) UL (ref 4–11)

## 2021-02-26 PROCEDURE — 85610 PROTHROMBIN TIME: CPT

## 2021-02-26 PROCEDURE — 85025 COMPLETE CBC W/AUTO DIFF WBC: CPT

## 2021-02-26 PROCEDURE — 80053 COMPREHEN METABOLIC PANEL: CPT

## 2021-03-01 NOTE — DISCHARGE SUMMARY
[facility] HOSPITALIST  DISCHARGE SUMMARY     Nydia Deleon Patient Status:  Observation    10/30/1931 MRN [id number]   [facility] 7NE-A Attending No att. providers found   Hosp Day # 0 PCP Jm Woo MD     Date of Admission: 2021 Daily(Beta Blocker). Quantity: 60 tablet  Refills: 3     Warfarin Sodium 5 MG Tabs  Commonly known as: Coumadin      Take 1 tablet (5 mg total) by mouth nightly.    Quantity: 30 tablet  Refills: 3        CONTINUE taking these medications      Instructions Take 1 tablet by mouth 2 (two) times a day. Refills: 0     Vitamin B-12 1000 MCG Tabs  Commonly known as: VITAMIN B12      Take 1 tablet (1,000 mcg total) by mouth daily.    Quantity: 90 tablet  Refills: 3        STOP taking these medications    Acetam

## 2021-03-03 DIAGNOSIS — Z23 NEED FOR VACCINATION: ICD-10-CM

## 2021-03-10 RX ORDER — ERYTHROMYCIN 5 MG/G
OINTMENT OPHTHALMIC
COMMUNITY
Start: 2020-06-08 | End: 2021-03-11

## 2021-03-10 RX ORDER — GABAPENTIN 100 MG/1
100 CAPSULE ORAL 2 TIMES DAILY
COMMUNITY
Start: 2019-09-16

## 2021-03-10 RX ORDER — ACETAMINOPHEN 325 MG/1
650 TABLET ORAL EVERY 4 HOURS PRN
COMMUNITY
Start: 2020-11-20

## 2021-03-10 RX ORDER — POLYETHYLENE GLYCOL 3350 17 G/17G
17 POWDER, FOR SOLUTION ORAL
COMMUNITY
Start: 2019-02-25

## 2021-03-10 RX ORDER — DOCUSATE SODIUM 100 MG/1
100 CAPSULE, LIQUID FILLED ORAL
COMMUNITY
Start: 2021-02-09

## 2021-03-10 RX ORDER — DILTIAZEM HYDROCHLORIDE 120 MG/1
120 CAPSULE, EXTENDED RELEASE ORAL 2 TIMES DAILY
COMMUNITY
Start: 2021-02-23 | End: 2022-02-23

## 2021-03-10 RX ORDER — LANOLIN ALCOHOL/MO/W.PET/CERES
325 CREAM (GRAM) TOPICAL
COMMUNITY

## 2021-03-10 RX ORDER — POTASSIUM CHLORIDE 750 MG/1
10 TABLET, FILM COATED, EXTENDED RELEASE ORAL
COMMUNITY
Start: 2020-08-25 | End: 2021-03-11

## 2021-03-10 RX ORDER — VIT A/VIT C/VIT E/ZINC/COPPER 2148-113
1 TABLET ORAL 2 TIMES DAILY
COMMUNITY

## 2021-03-10 RX ORDER — MECLIZINE HYDROCHLORIDE 25 MG/1
25 TABLET ORAL
COMMUNITY
Start: 2019-12-25 | End: 2021-03-11

## 2021-03-10 RX ORDER — FUROSEMIDE 20 MG/1
20 TABLET ORAL
COMMUNITY
Start: 2020-08-25 | End: 2021-03-11

## 2021-03-10 RX ORDER — IVERMECTIN 10 MG/G
CREAM TOPICAL
COMMUNITY
Start: 2021-02-08

## 2021-03-10 RX ORDER — LANOLIN ALCOHOL/MO/W.PET/CERES
1000 CREAM (GRAM) TOPICAL
COMMUNITY
Start: 2019-09-16

## 2021-03-10 RX ORDER — ATORVASTATIN CALCIUM 40 MG/1
40 TABLET, FILM COATED ORAL NIGHTLY
COMMUNITY
Start: 2017-12-01

## 2021-03-10 RX ORDER — ACETAMINOPHEN 160 MG
TABLET,DISINTEGRATING ORAL
COMMUNITY

## 2021-03-10 RX ORDER — BIOTIN 10000 MCG
1 CAPSULE ORAL
COMMUNITY
End: 2021-03-11

## 2021-03-10 RX ORDER — WARFARIN SODIUM 5 MG/1
5 TABLET ORAL
COMMUNITY
Start: 2021-02-23

## 2021-03-10 RX ORDER — METRONIDAZOLE 7.5 MG/G
GEL TOPICAL
COMMUNITY
Start: 2018-12-27 | End: 2021-03-11

## 2021-03-10 RX ORDER — INSULIN ASPART 100 [IU]/ML
INJECTION, SOLUTION INTRAVENOUS; SUBCUTANEOUS
COMMUNITY
Start: 2021-02-24

## 2021-03-10 RX ORDER — LANOLIN ALCOHOL/MO/W.PET/CERES
3 CREAM (GRAM) TOPICAL NIGHTLY
COMMUNITY
Start: 2020-11-20

## 2021-03-10 RX ORDER — METOPROLOL TARTRATE 50 MG/1
50 TABLET, FILM COATED ORAL 2 TIMES DAILY
COMMUNITY
Start: 2021-02-23

## 2021-03-10 RX ORDER — LISINOPRIL 5 MG/1
TABLET ORAL
COMMUNITY
Start: 2021-01-07 | End: 2021-03-11

## 2021-03-10 RX ORDER — ONDANSETRON 4 MG/1
4 TABLET, FILM COATED ORAL EVERY 4 HOURS PRN
COMMUNITY
Start: 2019-09-16

## 2021-03-10 RX ORDER — DULOXETIN HYDROCHLORIDE 30 MG/1
30 CAPSULE, DELAYED RELEASE ORAL DAILY
COMMUNITY
Start: 2020-10-22

## 2021-03-11 ENCOUNTER — OFFICE VISIT (OUTPATIENT)
Dept: CARDIOLOGY | Age: 86
End: 2021-03-11

## 2021-03-11 VITALS — SYSTOLIC BLOOD PRESSURE: 126 MMHG | DIASTOLIC BLOOD PRESSURE: 74 MMHG | HEIGHT: 64 IN | HEART RATE: 82 BPM

## 2021-03-11 DIAGNOSIS — I48.92 ATRIAL FLUTTER, UNSPECIFIED TYPE (CMD): ICD-10-CM

## 2021-03-11 DIAGNOSIS — I10 ESSENTIAL HYPERTENSION: ICD-10-CM

## 2021-03-11 DIAGNOSIS — Z09 HOSPITAL DISCHARGE FOLLOW-UP: Primary | ICD-10-CM

## 2021-03-11 PROCEDURE — 99214 OFFICE O/P EST MOD 30 MIN: CPT | Performed by: NURSE PRACTITIONER

## 2021-03-11 PROCEDURE — 93000 ELECTROCARDIOGRAM COMPLETE: CPT | Performed by: NURSE PRACTITIONER

## 2021-03-11 ASSESSMENT — ENCOUNTER SYMPTOMS
GASTROINTESTINAL NEGATIVE: 1
NEUROLOGICAL NEGATIVE: 1
DIAPHORESIS: 0
DECREASED APPETITE: 0
SYNCOPE: 0
WEIGHT LOSS: 0
SHORTNESS OF BREATH: 0
WEIGHT GAIN: 0

## 2021-03-12 ENCOUNTER — MED REC SCAN ONLY (OUTPATIENT)
Dept: FAMILY MEDICINE CLINIC | Facility: CLINIC | Age: 86
End: 2021-03-12

## 2021-03-15 RX ORDER — MULTIVIT-MIN/FA/LYCOPEN/LUTEIN .4-300-25
TABLET ORAL
Qty: 220 TABLET | Refills: 0 | Status: SHIPPED | OUTPATIENT
Start: 2021-03-15 | End: 2021-05-26

## 2021-03-18 ENCOUNTER — NURSE ONLY (OUTPATIENT)
Dept: LAB | Age: 86
End: 2021-03-18
Attending: INTERNAL MEDICINE
Payer: MEDICARE

## 2021-03-18 DIAGNOSIS — E87.8 ELECTROLYTE ABNORMALITY: Primary | ICD-10-CM

## 2021-03-18 LAB
ANION GAP SERPL CALC-SCNC: 5 MMOL/L (ref 0–18)
BUN BLD-MCNC: 21 MG/DL (ref 7–18)
BUN/CREAT SERPL: 21.2 (ref 10–20)
CALCIUM BLD-MCNC: 9.5 MG/DL (ref 8.5–10.1)
CHLORIDE SERPL-SCNC: 106 MMOL/L (ref 98–112)
CO2 SERPL-SCNC: 29 MMOL/L (ref 21–32)
CREAT BLD-MCNC: 0.99 MG/DL
GLUCOSE BLD-MCNC: 144 MG/DL (ref 70–99)
OSMOLALITY SERPL CALC.SUM OF ELEC: 296 MOSM/KG (ref 275–295)
PATIENT FASTING Y/N/NP: YES
POTASSIUM SERPL-SCNC: 4.5 MMOL/L (ref 3.5–5.1)
SODIUM SERPL-SCNC: 140 MMOL/L (ref 136–145)

## 2021-03-18 PROCEDURE — 80048 BASIC METABOLIC PNL TOTAL CA: CPT

## 2021-03-19 ENCOUNTER — ANTI-COAG (OUTPATIENT)
Dept: CARDIOLOGY | Age: 86
End: 2021-03-19

## 2021-03-19 DIAGNOSIS — I48.92 ATRIAL FLUTTER, UNSPECIFIED TYPE (CMD): ICD-10-CM

## 2021-03-22 ENCOUNTER — ANTI-COAG (OUTPATIENT)
Dept: CARDIOLOGY | Age: 86
End: 2021-03-22

## 2021-03-22 ENCOUNTER — NURSE ONLY (OUTPATIENT)
Dept: LAB | Age: 86
End: 2021-03-22
Attending: INTERNAL MEDICINE
Payer: MEDICARE

## 2021-03-22 DIAGNOSIS — I48.92 ATRIAL FLUTTER (HCC): Primary | ICD-10-CM

## 2021-03-22 DIAGNOSIS — I48.92 ATRIAL FLUTTER, UNSPECIFIED TYPE (CMD): ICD-10-CM

## 2021-03-22 LAB
INR BLD: 3.69 (ref 0.89–1.11)
INR PPP: 3.69
PSA SERPL DL<=0.01 NG/ML-MCNC: 37.4 SECONDS (ref 12.4–14.6)

## 2021-03-22 PROCEDURE — 85610 PROTHROMBIN TIME: CPT

## 2021-03-25 ENCOUNTER — ANTI-COAG (OUTPATIENT)
Dept: CARDIOLOGY | Age: 86
End: 2021-03-25

## 2021-03-25 ENCOUNTER — NURSE ONLY (OUTPATIENT)
Dept: LAB | Age: 86
End: 2021-03-25
Attending: INTERNAL MEDICINE
Payer: MEDICARE

## 2021-03-25 DIAGNOSIS — I48.91 A-FIB (HCC): Primary | ICD-10-CM

## 2021-03-25 DIAGNOSIS — I48.92 ATRIAL FLUTTER, UNSPECIFIED TYPE (CMD): ICD-10-CM

## 2021-03-25 LAB
INR BLD: 3.39 (ref 0.89–1.11)
INR PPP: 3.39
PSA SERPL DL<=0.01 NG/ML-MCNC: 35 SECONDS (ref 12.4–14.6)

## 2021-03-25 PROCEDURE — 85610 PROTHROMBIN TIME: CPT

## 2021-03-29 ENCOUNTER — ANTI-COAG (OUTPATIENT)
Dept: CARDIOLOGY | Age: 86
End: 2021-03-29

## 2021-03-29 ENCOUNTER — NURSE ONLY (OUTPATIENT)
Dept: LAB | Age: 86
End: 2021-03-29
Attending: INTERNAL MEDICINE
Payer: MEDICARE

## 2021-03-29 DIAGNOSIS — I48.92 ATRIAL FLUTTER, UNSPECIFIED TYPE (CMD): ICD-10-CM

## 2021-03-29 DIAGNOSIS — I48.91 A-FIB (HCC): Primary | ICD-10-CM

## 2021-03-29 LAB — INR PPP: 1.32

## 2021-03-29 PROCEDURE — 85610 PROTHROMBIN TIME: CPT

## 2021-04-01 ENCOUNTER — ANTI-COAG (OUTPATIENT)
Dept: CARDIOLOGY | Age: 86
End: 2021-04-01

## 2021-04-01 ENCOUNTER — NURSE ONLY (OUTPATIENT)
Dept: LAB | Age: 86
End: 2021-04-01
Attending: INTERNAL MEDICINE
Payer: MEDICARE

## 2021-04-01 DIAGNOSIS — I48.92 ATRIAL FLUTTER, UNSPECIFIED TYPE (CMD): ICD-10-CM

## 2021-04-01 DIAGNOSIS — I48.91 A-FIB (HCC): Primary | ICD-10-CM

## 2021-04-01 LAB — INR PPP: 1.4

## 2021-04-01 PROCEDURE — 85610 PROTHROMBIN TIME: CPT

## 2021-04-05 ENCOUNTER — NURSE ONLY (OUTPATIENT)
Dept: LAB | Age: 86
End: 2021-04-05
Attending: INTERNAL MEDICINE
Payer: MEDICARE

## 2021-04-05 ENCOUNTER — ANTI-COAG (OUTPATIENT)
Dept: CARDIOLOGY | Age: 86
End: 2021-04-05

## 2021-04-05 DIAGNOSIS — I48.91 A-FIB (HCC): Primary | ICD-10-CM

## 2021-04-05 DIAGNOSIS — I48.92 ATRIAL FLUTTER, UNSPECIFIED TYPE (CMD): ICD-10-CM

## 2021-04-05 LAB — INR PPP: 1.51

## 2021-04-05 PROCEDURE — 85610 PROTHROMBIN TIME: CPT

## 2021-04-08 ENCOUNTER — NURSE ONLY (OUTPATIENT)
Dept: LAB | Age: 86
End: 2021-04-08
Attending: INTERNAL MEDICINE
Payer: MEDICARE

## 2021-04-08 DIAGNOSIS — I48.91 A-FIB (HCC): Primary | ICD-10-CM

## 2021-04-08 LAB — INR PPP: 1.77

## 2021-04-08 PROCEDURE — 85610 PROTHROMBIN TIME: CPT

## 2021-04-09 ENCOUNTER — ANTI-COAG (OUTPATIENT)
Dept: CARDIOLOGY | Age: 86
End: 2021-04-09

## 2021-04-09 DIAGNOSIS — I48.92 ATRIAL FLUTTER, UNSPECIFIED TYPE (CMD): ICD-10-CM

## 2021-04-12 ENCOUNTER — ANTI-COAG (OUTPATIENT)
Dept: CARDIOLOGY | Age: 86
End: 2021-04-12

## 2021-04-12 ENCOUNTER — NURSE ONLY (OUTPATIENT)
Dept: LAB | Age: 86
End: 2021-04-12
Attending: INTERNAL MEDICINE
Payer: MEDICARE

## 2021-04-12 DIAGNOSIS — I48.92 ATRIAL FLUTTER, UNSPECIFIED TYPE (CMD): ICD-10-CM

## 2021-04-12 DIAGNOSIS — I48.91 A-FIB (HCC): Primary | ICD-10-CM

## 2021-04-12 LAB — INR PPP: 2.86

## 2021-04-12 PROCEDURE — 85610 PROTHROMBIN TIME: CPT

## 2021-04-19 ENCOUNTER — APPOINTMENT (OUTPATIENT)
Dept: CARDIOLOGY | Age: 86
End: 2021-04-19

## 2021-04-22 ENCOUNTER — ANTI-COAG (OUTPATIENT)
Dept: CARDIOLOGY | Age: 86
End: 2021-04-22

## 2021-04-22 ENCOUNTER — NURSE ONLY (OUTPATIENT)
Dept: LAB | Age: 86
End: 2021-04-22
Attending: INTERNAL MEDICINE
Payer: MEDICARE

## 2021-04-22 DIAGNOSIS — I48.91 A-FIB (HCC): Primary | ICD-10-CM

## 2021-04-22 DIAGNOSIS — I48.92 ATRIAL FLUTTER, UNSPECIFIED TYPE (CMD): ICD-10-CM

## 2021-04-22 LAB — INR PPP: 1.94

## 2021-04-22 PROCEDURE — 85610 PROTHROMBIN TIME: CPT

## 2021-04-29 ENCOUNTER — ANTI-COAG (OUTPATIENT)
Dept: CARDIOLOGY | Age: 86
End: 2021-04-29

## 2021-04-29 ENCOUNTER — NURSE ONLY (OUTPATIENT)
Dept: LAB | Age: 86
End: 2021-04-29
Attending: INTERNAL MEDICINE
Payer: MEDICARE

## 2021-04-29 DIAGNOSIS — I48.92 ATRIAL FLUTTER, UNSPECIFIED TYPE (CMD): ICD-10-CM

## 2021-04-29 DIAGNOSIS — E11.9 TYPE 2 DIABETES MELLITUS (HCC): Primary | ICD-10-CM

## 2021-04-29 LAB — INR PPP: 1.78

## 2021-04-29 PROCEDURE — 85610 PROTHROMBIN TIME: CPT

## 2021-05-06 ENCOUNTER — NURSE ONLY (OUTPATIENT)
Dept: LAB | Age: 86
End: 2021-05-06
Attending: INTERNAL MEDICINE
Payer: MEDICARE

## 2021-05-06 ENCOUNTER — ANTI-COAG (OUTPATIENT)
Dept: CARDIOLOGY | Age: 86
End: 2021-05-06

## 2021-05-06 DIAGNOSIS — I48.91 A-FIB (HCC): Primary | ICD-10-CM

## 2021-05-06 DIAGNOSIS — I48.92 ATRIAL FLUTTER, UNSPECIFIED TYPE (CMD): ICD-10-CM

## 2021-05-06 LAB — INR PPP: 1.89

## 2021-05-06 PROCEDURE — 85610 PROTHROMBIN TIME: CPT

## 2021-05-07 ENCOUNTER — TELEPHONE (OUTPATIENT)
Dept: CARDIOLOGY | Age: 86
End: 2021-05-07

## 2021-05-13 ENCOUNTER — NURSE ONLY (OUTPATIENT)
Dept: LAB | Age: 86
End: 2021-05-13
Attending: INTERNAL MEDICINE
Payer: MEDICARE

## 2021-05-13 DIAGNOSIS — E11.9 DM (DIABETES MELLITUS), TYPE 2 (HCC): ICD-10-CM

## 2021-05-13 DIAGNOSIS — E78.5 HYPERLIPIDEMIA: Primary | ICD-10-CM

## 2021-05-13 LAB — INR PPP: 3.16

## 2021-05-13 PROCEDURE — 85610 PROTHROMBIN TIME: CPT

## 2021-05-18 ENCOUNTER — ANTI-COAG (OUTPATIENT)
Dept: CARDIOLOGY | Age: 86
End: 2021-05-18

## 2021-05-18 DIAGNOSIS — I48.92 ATRIAL FLUTTER, UNSPECIFIED TYPE (CMD): ICD-10-CM

## 2021-05-19 ENCOUNTER — TELEPHONE (OUTPATIENT)
Dept: CARDIOLOGY | Age: 86
End: 2021-05-19

## 2021-05-19 ENCOUNTER — ANTI-COAG (OUTPATIENT)
Dept: CARDIOLOGY | Age: 86
End: 2021-05-19

## 2021-05-19 ENCOUNTER — NURSE ONLY (OUTPATIENT)
Dept: LAB | Age: 86
End: 2021-05-19
Attending: FAMILY MEDICINE
Payer: MEDICARE

## 2021-05-19 DIAGNOSIS — R69 DIAGNOSIS UNKNOWN: Primary | ICD-10-CM

## 2021-05-19 DIAGNOSIS — I48.92 ATRIAL FLUTTER, UNSPECIFIED TYPE (CMD): ICD-10-CM

## 2021-05-19 LAB — INR PPP: 2.87

## 2021-05-19 PROCEDURE — 85610 PROTHROMBIN TIME: CPT

## 2021-05-26 PROBLEM — F41.9 ANXIETY: Status: ACTIVE | Noted: 2021-05-26

## 2021-05-26 PROBLEM — R29.898 SHOULDER WEAKNESS: Status: ACTIVE | Noted: 2017-05-17

## 2021-05-26 PROBLEM — I10 HTN (HYPERTENSION): Status: ACTIVE | Noted: 2021-05-26

## 2021-05-27 ENCOUNTER — NURSE ONLY (OUTPATIENT)
Dept: LAB | Age: 86
End: 2021-05-27
Attending: INTERNAL MEDICINE
Payer: MEDICARE

## 2021-05-27 ENCOUNTER — ANTI-COAG (OUTPATIENT)
Dept: CARDIOLOGY | Age: 86
End: 2021-05-27

## 2021-05-27 ENCOUNTER — TELEPHONE (OUTPATIENT)
Dept: CARDIOLOGY | Age: 86
End: 2021-05-27

## 2021-05-27 DIAGNOSIS — I48.92 ATRIAL FLUTTER, UNSPECIFIED TYPE (CMD): ICD-10-CM

## 2021-05-27 DIAGNOSIS — I48.92 ATRIAL FLUTTER (HCC): Primary | ICD-10-CM

## 2021-05-27 LAB — INR PPP: 3.34

## 2021-05-27 PROCEDURE — 85610 PROTHROMBIN TIME: CPT

## 2021-06-03 ENCOUNTER — TELEPHONE (OUTPATIENT)
Dept: CARDIOLOGY | Age: 86
End: 2021-06-03

## 2021-06-03 ENCOUNTER — ANTI-COAG (OUTPATIENT)
Dept: CARDIOLOGY | Age: 86
End: 2021-06-03

## 2021-06-03 ENCOUNTER — NURSE ONLY (OUTPATIENT)
Dept: LAB | Age: 86
End: 2021-06-03
Attending: INTERNAL MEDICINE
Payer: MEDICARE

## 2021-06-03 DIAGNOSIS — I48.91 ATRIAL FIBRILLATION (HCC): Primary | ICD-10-CM

## 2021-06-03 DIAGNOSIS — I48.92 ATRIAL FLUTTER, UNSPECIFIED TYPE (CMD): ICD-10-CM

## 2021-06-03 LAB — INR PPP: 2.63

## 2021-06-03 PROCEDURE — 85610 PROTHROMBIN TIME: CPT

## 2021-06-04 ENCOUNTER — TELEPHONE (OUTPATIENT)
Dept: CARDIOLOGY | Age: 86
End: 2021-06-04

## 2021-06-10 ENCOUNTER — ANTI-COAG (OUTPATIENT)
Dept: CARDIOLOGY | Age: 86
End: 2021-06-10

## 2021-06-10 ENCOUNTER — TELEPHONE (OUTPATIENT)
Dept: CARDIOLOGY | Age: 86
End: 2021-06-10

## 2021-06-10 ENCOUNTER — NURSE ONLY (OUTPATIENT)
Dept: LAB | Age: 86
End: 2021-06-10
Attending: INTERNAL MEDICINE
Payer: MEDICARE

## 2021-06-10 DIAGNOSIS — I48.92 ATRIAL FLUTTER, UNSPECIFIED TYPE (CMD): ICD-10-CM

## 2021-06-10 DIAGNOSIS — E11.9 DM2 (DIABETES MELLITUS, TYPE 2) (HCC): Primary | ICD-10-CM

## 2021-06-10 DIAGNOSIS — I48.91 A-FIB (HCC): ICD-10-CM

## 2021-06-10 LAB — INR PPP: 2.93

## 2021-06-10 PROCEDURE — 80061 LIPID PANEL: CPT

## 2021-06-10 PROCEDURE — 82607 VITAMIN B-12: CPT

## 2021-06-10 PROCEDURE — 80053 COMPREHEN METABOLIC PANEL: CPT

## 2021-06-10 PROCEDURE — 85025 COMPLETE CBC W/AUTO DIFF WBC: CPT

## 2021-06-10 PROCEDURE — 82306 VITAMIN D 25 HYDROXY: CPT

## 2021-06-10 PROCEDURE — 84443 ASSAY THYROID STIM HORMONE: CPT

## 2021-06-10 PROCEDURE — 83036 HEMOGLOBIN GLYCOSYLATED A1C: CPT

## 2021-06-10 PROCEDURE — 85610 PROTHROMBIN TIME: CPT

## 2021-06-11 ENCOUNTER — TELEPHONE (OUTPATIENT)
Dept: CARDIOLOGY | Age: 86
End: 2021-06-11

## 2021-06-11 ENCOUNTER — MED REC SCAN ONLY (OUTPATIENT)
Dept: FAMILY MEDICINE CLINIC | Facility: CLINIC | Age: 86
End: 2021-06-11

## 2021-06-17 ENCOUNTER — ANTI-COAG (OUTPATIENT)
Dept: CARDIOLOGY | Age: 86
End: 2021-06-17

## 2021-06-17 ENCOUNTER — NURSE ONLY (OUTPATIENT)
Dept: LAB | Age: 86
End: 2021-06-17
Attending: FAMILY MEDICINE
Payer: MEDICARE

## 2021-06-17 ENCOUNTER — TELEPHONE (OUTPATIENT)
Dept: CARDIOLOGY | Age: 86
End: 2021-06-17

## 2021-06-17 DIAGNOSIS — I48.92 ATRIAL FLUTTER, UNSPECIFIED TYPE (CMD): Primary | ICD-10-CM

## 2021-06-17 DIAGNOSIS — I48.91 A-FIB (HCC): Primary | ICD-10-CM

## 2021-06-17 LAB — INR PPP: 2.39

## 2021-06-17 PROCEDURE — 85610 PROTHROMBIN TIME: CPT

## 2021-06-24 ENCOUNTER — TELEPHONE (OUTPATIENT)
Dept: CARDIOLOGY | Age: 86
End: 2021-06-24

## 2021-06-24 ENCOUNTER — NURSE ONLY (OUTPATIENT)
Dept: LAB | Age: 86
End: 2021-06-24
Attending: INTERNAL MEDICINE
Payer: MEDICARE

## 2021-06-24 DIAGNOSIS — I48.92 ATRIAL FLUTTER, UNSPECIFIED TYPE (CMD): Primary | ICD-10-CM

## 2021-06-24 DIAGNOSIS — E11.9 TYPE 2 DIABETES MELLITUS (HCC): ICD-10-CM

## 2021-06-24 DIAGNOSIS — F32.A DEPRESSION: ICD-10-CM

## 2021-06-24 DIAGNOSIS — D64.9 ANEMIA: Primary | ICD-10-CM

## 2021-06-24 LAB — INR PPP: 3.15

## 2021-06-24 PROCEDURE — 85610 PROTHROMBIN TIME: CPT

## 2021-07-01 ENCOUNTER — NURSE ONLY (OUTPATIENT)
Dept: LAB | Age: 86
End: 2021-07-01
Attending: INTERNAL MEDICINE
Payer: MEDICARE

## 2021-07-01 DIAGNOSIS — I48.92 ATRIAL FLUTTER WITH RAPID VENTRICULAR RESPONSE (HCC): ICD-10-CM

## 2021-07-01 LAB
INR BLD: 2.59 (ref 0.89–1.11)
PSA SERPL DL<=0.01 NG/ML-MCNC: 28.4 SECONDS (ref 12.4–14.6)

## 2021-07-01 PROCEDURE — 85610 PROTHROMBIN TIME: CPT

## 2021-07-21 ENCOUNTER — ANTI-COAG (OUTPATIENT)
Dept: CARDIOLOGY | Age: 86
End: 2021-07-21

## 2021-07-21 DIAGNOSIS — I48.92 ATRIAL FLUTTER, UNSPECIFIED TYPE (CMD): Primary | ICD-10-CM

## 2021-08-10 PROBLEM — M17.11 PRIMARY OSTEOARTHRITIS OF RIGHT KNEE: Status: ACTIVE | Noted: 2021-08-10

## 2021-08-19 RX ORDER — PHENOL 1.4 %
AEROSOL, SPRAY (ML) MUCOUS MEMBRANE
Qty: 220 TABLET | Refills: 0 | Status: SHIPPED | OUTPATIENT
Start: 2021-08-19 | End: 2021-11-22

## 2021-08-19 RX ORDER — LIDOCAINE HYDROCHLORIDE 20 MG/ML
SOLUTION ORAL; TOPICAL
Qty: 90 TABLET | Refills: 3 | Status: SHIPPED | OUTPATIENT
Start: 2021-08-19 | End: 2021-11-22

## 2021-08-21 ENCOUNTER — APPOINTMENT (OUTPATIENT)
Dept: GENERAL RADIOLOGY | Facility: HOSPITAL | Age: 86
End: 2021-08-21
Attending: EMERGENCY MEDICINE
Payer: MEDICARE

## 2021-08-21 ENCOUNTER — APPOINTMENT (OUTPATIENT)
Dept: MRI IMAGING | Facility: HOSPITAL | Age: 86
End: 2021-08-21
Attending: EMERGENCY MEDICINE
Payer: MEDICARE

## 2021-08-21 ENCOUNTER — HOSPITAL ENCOUNTER (EMERGENCY)
Facility: HOSPITAL | Age: 86
Discharge: HOME | End: 2021-08-21
Attending: EMERGENCY MEDICINE
Payer: MEDICARE

## 2021-08-21 ENCOUNTER — APPOINTMENT (OUTPATIENT)
Dept: CT IMAGING | Facility: HOSPITAL | Age: 86
End: 2021-08-21
Attending: EMERGENCY MEDICINE
Payer: MEDICARE

## 2021-08-21 VITALS
BODY MASS INDEX: 22.49 KG/M2 | HEART RATE: 86 BPM | TEMPERATURE: 98 F | RESPIRATION RATE: 16 BRPM | SYSTOLIC BLOOD PRESSURE: 107 MMHG | HEIGHT: 65 IN | OXYGEN SATURATION: 99 % | DIASTOLIC BLOOD PRESSURE: 70 MMHG | WEIGHT: 135 LBS

## 2021-08-21 VITALS
OXYGEN SATURATION: 100 % | HEART RATE: 88 BPM | DIASTOLIC BLOOD PRESSURE: 69 MMHG | HEIGHT: 65 IN | BODY MASS INDEX: 22.49 KG/M2 | WEIGHT: 135 LBS | TEMPERATURE: 97 F | RESPIRATION RATE: 17 BRPM | SYSTOLIC BLOOD PRESSURE: 105 MMHG

## 2021-08-21 DIAGNOSIS — R29.6 FALLS FREQUENTLY: Primary | ICD-10-CM
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
## 2021-08-21 DIAGNOSIS — S70.00XA CONTUSION OF HIP, UNSPECIFIED LATERALITY, INITIAL ENCOUNTER: ICD-10-CM

## 2021-08-21 DIAGNOSIS — S80.01XA CONTUSION OF RIGHT KNEE, INITIAL ENCOUNTER: ICD-10-CM

## 2021-08-21 PROCEDURE — 73721 MRI JNT OF LWR EXTRE W/O DYE: CPT | Performed by: EMERGENCY MEDICINE

## 2021-08-21 PROCEDURE — 99284 EMERGENCY DEPT VISIT MOD MDM: CPT

## 2021-08-21 PROCEDURE — 99285 EMERGENCY DEPT VISIT HI MDM: CPT

## 2021-08-21 PROCEDURE — 73590 X-RAY EXAM OF LOWER LEG: CPT | Performed by: EMERGENCY MEDICINE

## 2021-08-21 PROCEDURE — 76377 3D RENDER W/INTRP POSTPROCES: CPT

## 2021-08-21 PROCEDURE — 73700 CT LOWER EXTREMITY W/O DYE: CPT | Performed by: EMERGENCY MEDICINE

## 2021-08-21 PROCEDURE — 70450 CT HEAD/BRAIN W/O DYE: CPT | Performed by: EMERGENCY MEDICINE

## 2021-08-21 PROCEDURE — 73562 X-RAY EXAM OF KNEE 3: CPT | Performed by: EMERGENCY MEDICINE

## 2021-08-21 PROCEDURE — 73523 X-RAY EXAM HIPS BI 5/> VIEWS: CPT | Performed by: EMERGENCY MEDICINE

## 2021-08-21 NOTE — ED INITIAL ASSESSMENT (HPI)
Brought by EMS from [facility]  With history of fell down in the toilet 1 hour ago . As per patient ahe had a bowel movement and suddenly she feels pain in her right knee and she fell down did not hit her head .  Complaining of right knee pain

## 2021-08-21 NOTE — ED QUICK NOTES
Dr Humberto Mcdonald - needs MRI of knee - questionable fracture, unknown if acute or remote. Unable to clearly detect because of patient's significant osteoporosis.

## 2021-08-21 NOTE — ED INITIAL ASSESSMENT (HPI)
Patient presents via EMS for re-evaluation of right knee. Patient was seen in ED overnight after a fall. She had CT of her right knee completed and that time and was discharged.  This Charge RN took call from radiologist coming on at [address] [place] [address] that stated his ove

## 2021-08-21 NOTE — ED PROVIDER NOTES
Patient Seen in: [facility] Emergency Department      History   Patient presents with:  Abnormal Result    Stated Complaint: re-evaluation of right knee based on imaging     HPI/Subjective:   HPI    This is a 77-year-old female independent living wh and no wheezing. HEART:  Regular rate and rhythm. S1 and S2. No murmurs, no rubs or gallops. ABDOMEN: Soft, nontender and nondistended. Normoactive bowel sounds. No rebound. No guarding. EXTREMITIES: Tenderness on the medial aspect of the right knee.

## 2021-08-21 NOTE — ED PROVIDER NOTES
Patient Seen in: [facility] Emergency Department      History   Patient presents with:  Fall    Stated Complaint:     HPI/Subjective:   HPI    This is an 25-year-old woman, history of diabetes, here for evaluation after fall patient states she had u Device None (Room air)       Current:/70   Pulse 86   Temp 98.4 °F (36.9 °C) (Temporal)   Resp 16   Ht 165.1 cm (5' 5\")   Wt 61.2 kg   SpO2 99%   BMI 22.47 kg/m²         Physical Exam        Physical Exam  Vitals signs and nursing note reviewed.    G diagnosis)  Contusion of hip, unspecified laterality, initial encounter  Contusion of right knee, initial encounter     Disposition:  Discharge  8/21/2021  5:01 am    Follow-up:  Fabiola Soares MD  [address]  [facility] [address]

## 2021-09-18 ENCOUNTER — LAB REQUISITION (OUTPATIENT)
Dept: LAB | Facility: HOSPITAL | Age: 86
End: 2021-09-18
Payer: MEDICARE

## 2021-09-18 DIAGNOSIS — N39.0 URINARY TRACT INFECTION, SITE NOT SPECIFIED: ICD-10-CM

## 2021-09-18 LAB
BILIRUB UR QL STRIP.AUTO: NEGATIVE
COLOR UR AUTO: YELLOW
GLUCOSE UR STRIP.AUTO-MCNC: NEGATIVE MG/DL
HYALINE CASTS #/AREA URNS AUTO: PRESENT /LPF
KETONES UR STRIP.AUTO-MCNC: NEGATIVE MG/DL
NITRITE UR QL STRIP.AUTO: POSITIVE
PH UR STRIP.AUTO: 6 [PH] (ref 5–8)
PROT UR STRIP.AUTO-MCNC: NEGATIVE MG/DL
RBC UR QL AUTO: NEGATIVE
SP GR UR STRIP.AUTO: 1 (ref 1–1.03)
UROBILINOGEN UR STRIP.AUTO-MCNC: <2 MG/DL
WBC #/AREA URNS AUTO: >50 /HPF

## 2021-09-18 PROCEDURE — 87077 CULTURE AEROBIC IDENTIFY: CPT | Performed by: INTERNAL MEDICINE

## 2021-09-18 PROCEDURE — 81001 URINALYSIS AUTO W/SCOPE: CPT | Performed by: INTERNAL MEDICINE

## 2021-09-18 PROCEDURE — 87186 SC STD MICRODIL/AGAR DIL: CPT | Performed by: INTERNAL MEDICINE

## 2021-09-18 PROCEDURE — 87086 URINE CULTURE/COLONY COUNT: CPT | Performed by: INTERNAL MEDICINE

## 2021-09-23 ENCOUNTER — NURSE ONLY (OUTPATIENT)
Dept: LAB | Age: 86
End: 2021-09-23
Attending: INTERNAL MEDICINE
Payer: MEDICARE

## 2021-09-23 DIAGNOSIS — D64.9 ANEMIA: ICD-10-CM

## 2021-09-23 DIAGNOSIS — F32.A DEPRESSION: ICD-10-CM

## 2021-09-23 DIAGNOSIS — E78.5 HYPERLIPIDEMIA: Primary | ICD-10-CM

## 2021-09-23 LAB
INR BLD: 1.13 (ref 0.89–1.11)
PROTHROMBIN TIME: 14.8 SECONDS (ref 12.2–14.5)

## 2021-09-23 PROCEDURE — 85610 PROTHROMBIN TIME: CPT

## 2021-10-21 ENCOUNTER — NURSE ONLY (OUTPATIENT)
Dept: LAB | Age: 86
End: 2021-10-21
Attending: INTERNAL MEDICINE
Payer: MEDICARE

## 2021-10-21 DIAGNOSIS — Z87.898: Primary | ICD-10-CM

## 2021-10-21 LAB
ANION GAP SERPL CALC-SCNC: 6 MMOL/L (ref 0–18)
BUN BLD-MCNC: 25 MG/DL (ref 7–18)
CALCIUM BLD-MCNC: 10.7 MG/DL (ref 8.5–10.1)
CHLORIDE SERPL-SCNC: 98 MMOL/L (ref 98–112)
CO2 SERPL-SCNC: 31 MMOL/L (ref 21–32)
CREAT BLD-MCNC: 1.13 MG/DL
GLUCOSE BLD-MCNC: 122 MG/DL (ref 70–99)
OSMOLALITY SERPL CALC.SUM OF ELEC: 286 MOSM/KG (ref 275–295)
PATIENT FASTING Y/N/NP: YES
POTASSIUM SERPL-SCNC: 3.7 MMOL/L (ref 3.5–5.1)
SODIUM SERPL-SCNC: 135 MMOL/L (ref 136–145)

## 2021-10-21 PROCEDURE — 80048 BASIC METABOLIC PNL TOTAL CA: CPT

## 2021-11-14 ENCOUNTER — LAB REQUISITION (OUTPATIENT)
Dept: LAB | Facility: HOSPITAL | Age: 86
End: 2021-11-14
Payer: MEDICARE

## 2021-11-14 DIAGNOSIS — Z00.8 ENCOUNTER FOR OTHER GENERAL EXAMINATION: ICD-10-CM

## 2021-11-14 PROCEDURE — 87186 SC STD MICRODIL/AGAR DIL: CPT | Performed by: INTERNAL MEDICINE

## 2021-11-14 PROCEDURE — 81001 URINALYSIS AUTO W/SCOPE: CPT | Performed by: INTERNAL MEDICINE

## 2021-11-14 PROCEDURE — 87077 CULTURE AEROBIC IDENTIFY: CPT | Performed by: INTERNAL MEDICINE

## 2021-11-14 PROCEDURE — 87086 URINE CULTURE/COLONY COUNT: CPT | Performed by: INTERNAL MEDICINE

## 2021-11-18 ENCOUNTER — NURSE ONLY (OUTPATIENT)
Dept: LAB | Age: 86
End: 2021-11-18
Attending: INTERNAL MEDICINE
Payer: MEDICARE

## 2021-11-18 DIAGNOSIS — E46 MALNUTRITION RELATED DIABETES MELLITUS (HCC): Primary | ICD-10-CM

## 2021-11-18 DIAGNOSIS — E08.9 MALNUTRITION RELATED DIABETES MELLITUS (HCC): Primary | ICD-10-CM

## 2021-11-18 PROCEDURE — 85025 COMPLETE CBC W/AUTO DIFF WBC: CPT

## 2021-11-18 PROCEDURE — 80053 COMPREHEN METABOLIC PANEL: CPT

## 2021-12-02 ENCOUNTER — NURSE ONLY (OUTPATIENT)
Dept: LAB | Age: 86
End: 2021-12-02
Attending: INTERNAL MEDICINE
Payer: MEDICARE

## 2021-12-02 DIAGNOSIS — E11.00 DM HYPEROSMOLARITY TYPE II (HCC): ICD-10-CM

## 2021-12-02 DIAGNOSIS — I10 HTN (HYPERTENSION): Primary | ICD-10-CM

## 2021-12-02 PROCEDURE — 80048 BASIC METABOLIC PNL TOTAL CA: CPT

## 2021-12-02 PROCEDURE — 82607 VITAMIN B-12: CPT

## 2021-12-02 PROCEDURE — 84443 ASSAY THYROID STIM HORMONE: CPT

## 2021-12-02 PROCEDURE — 83036 HEMOGLOBIN GLYCOSYLATED A1C: CPT

## 2022-01-20 ENCOUNTER — NURSE ONLY (OUTPATIENT)
Dept: LAB | Age: 87
End: 2022-01-20
Attending: NURSE PRACTITIONER
Payer: MEDICARE

## 2022-01-20 DIAGNOSIS — I10 ESSENTIAL HYPERTENSION, MALIGNANT: ICD-10-CM

## 2022-01-20 DIAGNOSIS — E28.39 BMP15-RELATED PREMATURE OVARIAN FAILURE: Primary | ICD-10-CM

## 2022-01-20 LAB
ANION GAP SERPL CALC-SCNC: 6 MMOL/L (ref 0–18)
BUN BLD-MCNC: 29 MG/DL (ref 7–18)
CALCIUM BLD-MCNC: 8.6 MG/DL (ref 8.5–10.1)
CHLORIDE SERPL-SCNC: 103 MMOL/L (ref 98–112)
CO2 SERPL-SCNC: 30 MMOL/L (ref 21–32)
CREAT BLD-MCNC: 1 MG/DL
FASTING STATUS PATIENT QL REPORTED: YES
GLUCOSE BLD-MCNC: 125 MG/DL (ref 70–99)
OSMOLALITY SERPL CALC.SUM OF ELEC: 295 MOSM/KG (ref 275–295)
POTASSIUM SERPL-SCNC: 4.1 MMOL/L (ref 3.5–5.1)
SODIUM SERPL-SCNC: 139 MMOL/L (ref 136–145)

## 2022-01-20 PROCEDURE — 80048 BASIC METABOLIC PNL TOTAL CA: CPT

## 2022-03-17 ENCOUNTER — NURSE ONLY (OUTPATIENT)
Dept: LAB | Age: 87
End: 2022-03-17
Attending: NURSE PRACTITIONER
Payer: MEDICARE

## 2022-03-17 DIAGNOSIS — E78.5 HYPERLIPIDEMIA: ICD-10-CM

## 2022-03-17 DIAGNOSIS — I10 HTN (HYPERTENSION): Primary | ICD-10-CM

## 2022-03-17 DIAGNOSIS — D64.9 ANEMIA: ICD-10-CM

## 2022-03-17 LAB
ALBUMIN SERPL-MCNC: 3.2 G/DL (ref 3.4–5)
ALBUMIN/GLOB SERPL: 1.5 {RATIO} (ref 1–2)
ALP LIVER SERPL-CCNC: 70 U/L
ALT SERPL-CCNC: 12 U/L
ANION GAP SERPL CALC-SCNC: 6 MMOL/L (ref 0–18)
AST SERPL-CCNC: 13 U/L (ref 15–37)
BASOPHILS # BLD AUTO: 0.04 X10(3) UL (ref 0–0.2)
BASOPHILS NFR BLD AUTO: 0.7 %
BILIRUB SERPL-MCNC: 0.7 MG/DL (ref 0.1–2)
CALCIUM BLD-MCNC: 9.3 MG/DL (ref 8.5–10.1)
CHLORIDE SERPL-SCNC: 100 MMOL/L (ref 98–112)
CO2 SERPL-SCNC: 30 MMOL/L (ref 21–32)
CREAT BLD-MCNC: 1.17 MG/DL
EOSINOPHIL # BLD AUTO: 0.13 X10(3) UL (ref 0–0.7)
EOSINOPHIL NFR BLD AUTO: 2.3 %
ERYTHROCYTE [DISTWIDTH] IN BLOOD BY AUTOMATED COUNT: 12.4 %
EST. AVERAGE GLUCOSE BLD GHB EST-MCNC: 166 MG/DL (ref 68–126)
FASTING STATUS PATIENT QL REPORTED: YES
GLOBULIN PLAS-MCNC: 2.2 G/DL (ref 2.8–4.4)
GLUCOSE BLD-MCNC: 121 MG/DL (ref 70–99)
HCT VFR BLD AUTO: 30.2 %
HGB BLD-MCNC: 10.2 G/DL
IMM GRANULOCYTES # BLD AUTO: 0.02 X10(3) UL (ref 0–1)
IMM GRANULOCYTES NFR BLD: 0.4 %
LYMPHOCYTES # BLD AUTO: 1.92 X10(3) UL (ref 1–4)
LYMPHOCYTES NFR BLD AUTO: 34.3 %
MCH RBC QN AUTO: 33 PG (ref 26–34)
MCHC RBC AUTO-ENTMCNC: 33.8 G/DL (ref 31–37)
MCV RBC AUTO: 97.7 FL
MONOCYTES # BLD AUTO: 0.65 X10(3) UL (ref 0.1–1)
MONOCYTES NFR BLD AUTO: 11.6 %
NEUTROPHILS # BLD AUTO: 2.83 X10 (3) UL (ref 1.5–7.7)
NEUTROPHILS # BLD AUTO: 2.83 X10(3) UL (ref 1.5–7.7)
NEUTROPHILS NFR BLD AUTO: 50.7 %
OSMOLALITY SERPL CALC.SUM OF ELEC: 290 MOSM/KG (ref 275–295)
PLATELET # BLD AUTO: 181 10(3)UL (ref 150–450)
POTASSIUM SERPL-SCNC: 4.1 MMOL/L (ref 3.5–5.1)
PROT SERPL-MCNC: 5.4 G/DL (ref 6.4–8.2)
RBC # BLD AUTO: 3.09 X10(6)UL
SODIUM SERPL-SCNC: 136 MMOL/L (ref 136–145)
WBC # BLD AUTO: 5.6 X10(3) UL (ref 4–11)

## 2022-03-17 PROCEDURE — 80053 COMPREHEN METABOLIC PANEL: CPT

## 2022-03-17 PROCEDURE — 85025 COMPLETE CBC W/AUTO DIFF WBC: CPT

## 2022-03-17 PROCEDURE — 83036 HEMOGLOBIN GLYCOSYLATED A1C: CPT

## 2022-05-05 ENCOUNTER — LAB REQUISITION (OUTPATIENT)
Dept: LAB | Facility: HOSPITAL | Age: 87
End: 2022-05-05
Payer: MEDICARE

## 2022-05-05 LAB
CREAT UR-SCNC: 28.1 MG/DL
MICROALBUMIN UR-MCNC: 1.04 MG/DL
MICROALBUMIN/CREAT 24H UR-RTO: 37 UG/MG (ref ?–30)

## 2022-05-05 PROCEDURE — 82570 ASSAY OF URINE CREATININE: CPT | Performed by: NURSE PRACTITIONER

## 2022-05-05 PROCEDURE — 82043 UR ALBUMIN QUANTITATIVE: CPT | Performed by: NURSE PRACTITIONER

## 2022-07-17 PROBLEM — F03.90 DEMENTIA WITHOUT BEHAVIORAL DISTURBANCE (HCC): Status: ACTIVE | Noted: 2022-07-17

## 2022-07-17 PROBLEM — M19.011 BILATERAL SHOULDER REGION ARTHRITIS: Status: ACTIVE | Noted: 2022-07-17

## 2022-07-17 PROBLEM — M19.012 BILATERAL SHOULDER REGION ARTHRITIS: Status: ACTIVE | Noted: 2022-07-17

## 2022-07-21 ENCOUNTER — NURSE ONLY (OUTPATIENT)
Dept: LAB | Age: 87
End: 2022-07-21
Attending: NURSE PRACTITIONER
Payer: MEDICARE

## 2022-07-21 DIAGNOSIS — N18.1 CHRONIC KIDNEY DISEASE, STAGE I: ICD-10-CM

## 2022-07-21 DIAGNOSIS — E11.00 DM HYPEROSMOLARITY TYPE II (HCC): ICD-10-CM

## 2022-07-21 DIAGNOSIS — I10 HTN (HYPERTENSION): Primary | ICD-10-CM

## 2022-07-21 LAB
ALBUMIN SERPL-MCNC: 3.1 G/DL (ref 3.4–5)
ALBUMIN/GLOB SERPL: 1.2 {RATIO} (ref 1–2)
ALP LIVER SERPL-CCNC: 80 U/L
ALT SERPL-CCNC: 13 U/L
ANION GAP SERPL CALC-SCNC: 7 MMOL/L (ref 0–18)
AST SERPL-CCNC: 15 U/L (ref 15–37)
BASOPHILS # BLD AUTO: 0.03 X10(3) UL (ref 0–0.2)
BASOPHILS NFR BLD AUTO: 0.6 %
BILIRUB SERPL-MCNC: 0.7 MG/DL (ref 0.1–2)
BUN BLD-MCNC: 24 MG/DL (ref 7–18)
CALCIUM BLD-MCNC: 8.8 MG/DL (ref 8.5–10.1)
CHLORIDE SERPL-SCNC: 102 MMOL/L (ref 98–112)
CHOLEST SERPL-MCNC: 112 MG/DL (ref ?–200)
CO2 SERPL-SCNC: 28 MMOL/L (ref 21–32)
CREAT BLD-MCNC: 1.15 MG/DL
EOSINOPHIL # BLD AUTO: 0.21 X10(3) UL (ref 0–0.7)
EOSINOPHIL NFR BLD AUTO: 3.9 %
ERYTHROCYTE [DISTWIDTH] IN BLOOD BY AUTOMATED COUNT: 12.5 %
EST. AVERAGE GLUCOSE BLD GHB EST-MCNC: 169 MG/DL (ref 68–126)
FASTING PATIENT LIPID ANSWER: YES
FASTING STATUS PATIENT QL REPORTED: YES
FOLATE SERPL-MCNC: 40.3 NG/ML (ref 8.7–?)
GLOBULIN PLAS-MCNC: 2.6 G/DL (ref 2.8–4.4)
GLUCOSE BLD-MCNC: 141 MG/DL (ref 70–99)
HBA1C MFR BLD: 7.5 % (ref ?–5.7)
HCT VFR BLD AUTO: 31.4 %
HDLC SERPL-MCNC: 69 MG/DL (ref 40–59)
HGB BLD-MCNC: 10.5 G/DL
IMM GRANULOCYTES # BLD AUTO: 0.01 X10(3) UL (ref 0–1)
IMM GRANULOCYTES NFR BLD: 0.2 %
LDLC SERPL CALC-MCNC: 24 MG/DL (ref ?–100)
LYMPHOCYTES # BLD AUTO: 1.67 X10(3) UL (ref 1–4)
LYMPHOCYTES NFR BLD AUTO: 31.3 %
MCH RBC QN AUTO: 33 PG (ref 26–34)
MCHC RBC AUTO-ENTMCNC: 33.4 G/DL (ref 31–37)
MCV RBC AUTO: 98.7 FL
MONOCYTES # BLD AUTO: 0.56 X10(3) UL (ref 0.1–1)
MONOCYTES NFR BLD AUTO: 10.5 %
NEUTROPHILS # BLD AUTO: 2.86 X10 (3) UL (ref 1.5–7.7)
NEUTROPHILS # BLD AUTO: 2.86 X10(3) UL (ref 1.5–7.7)
NEUTROPHILS NFR BLD AUTO: 53.5 %
NONHDLC SERPL-MCNC: 43 MG/DL (ref ?–130)
OSMOLALITY SERPL CALC.SUM OF ELEC: 290 MOSM/KG (ref 275–295)
PLATELET # BLD AUTO: 201 10(3)UL (ref 150–450)
POTASSIUM SERPL-SCNC: 4.2 MMOL/L (ref 3.5–5.1)
PROT SERPL-MCNC: 5.7 G/DL (ref 6.4–8.2)
RBC # BLD AUTO: 3.18 X10(6)UL
SODIUM SERPL-SCNC: 137 MMOL/L (ref 136–145)
TRIGL SERPL-MCNC: 106 MG/DL (ref 30–149)
TSI SER-ACNC: 2.2 MIU/ML (ref 0.36–3.74)
VIT B12 SERPL-MCNC: 402 PG/ML (ref 193–986)
VIT D+METAB SERPL-MCNC: 41 NG/ML (ref 30–100)
VLDLC SERPL CALC-MCNC: 14 MG/DL (ref 0–30)
WBC # BLD AUTO: 5.3 X10(3) UL (ref 4–11)

## 2022-07-21 PROCEDURE — 82746 ASSAY OF FOLIC ACID SERUM: CPT

## 2022-07-21 PROCEDURE — 82306 VITAMIN D 25 HYDROXY: CPT

## 2022-07-21 PROCEDURE — 82607 VITAMIN B-12: CPT

## 2022-07-21 PROCEDURE — 84443 ASSAY THYROID STIM HORMONE: CPT

## 2022-07-21 PROCEDURE — 83036 HEMOGLOBIN GLYCOSYLATED A1C: CPT

## 2022-07-21 PROCEDURE — 80061 LIPID PANEL: CPT

## 2022-07-21 PROCEDURE — 85025 COMPLETE CBC W/AUTO DIFF WBC: CPT

## 2022-07-21 PROCEDURE — 80053 COMPREHEN METABOLIC PANEL: CPT

## 2022-08-02 ENCOUNTER — LAB REQUISITION (OUTPATIENT)
Dept: LAB | Facility: HOSPITAL | Age: 87
End: 2022-08-02
Payer: MEDICARE

## 2022-08-02 DIAGNOSIS — Z00.01 ENCOUNTER FOR GENERAL ADULT MEDICAL EXAMINATION WITH ABNORMAL FINDINGS: ICD-10-CM

## 2022-08-02 LAB
BILIRUB UR QL STRIP.AUTO: NEGATIVE
COLOR UR AUTO: YELLOW
GLUCOSE UR STRIP.AUTO-MCNC: NEGATIVE MG/DL
KETONES UR STRIP.AUTO-MCNC: NEGATIVE MG/DL
NITRITE UR QL STRIP.AUTO: POSITIVE
PH UR STRIP.AUTO: 7.5 [PH] (ref 5–8)
PROT UR STRIP.AUTO-MCNC: NEGATIVE MG/DL
SP GR UR STRIP.AUTO: 1.01 (ref 1–1.03)
UROBILINOGEN UR STRIP.AUTO-MCNC: 0.2 MG/DL
WBC #/AREA URNS AUTO: >50 /HPF

## 2022-08-02 PROCEDURE — 87086 URINE CULTURE/COLONY COUNT: CPT | Performed by: INTERNAL MEDICINE

## 2022-08-02 PROCEDURE — 81001 URINALYSIS AUTO W/SCOPE: CPT | Performed by: INTERNAL MEDICINE

## 2022-08-02 PROCEDURE — 87077 CULTURE AEROBIC IDENTIFY: CPT | Performed by: INTERNAL MEDICINE

## 2022-08-02 PROCEDURE — 87186 SC STD MICRODIL/AGAR DIL: CPT | Performed by: INTERNAL MEDICINE

## 2022-08-22 ENCOUNTER — APPOINTMENT (OUTPATIENT)
Dept: CT IMAGING | Facility: HOSPITAL | Age: 87
End: 2022-08-22
Attending: EMERGENCY MEDICINE
Payer: MEDICARE

## 2022-08-22 ENCOUNTER — HOSPITAL ENCOUNTER (OUTPATIENT)
Facility: HOSPITAL | Age: 87
Setting detail: OBSERVATION
Discharge: NURSING, REHAB OR LONG-TERM CARE | End: 2022-08-24
Attending: EMERGENCY MEDICINE | Admitting: INTERNAL MEDICINE
Payer: MEDICARE

## 2022-08-22 DIAGNOSIS — G45.9 TIA (TRANSIENT ISCHEMIC ATTACK): ICD-10-CM

## 2022-08-22 DIAGNOSIS — I48.91 ATRIAL FIBRILLATION WITH RAPID VENTRICULAR RESPONSE (HCC): Primary | ICD-10-CM

## 2022-08-22 PROBLEM — R79.89 AZOTEMIA: Status: ACTIVE | Noted: 2022-08-22

## 2022-08-22 PROBLEM — E87.1 HYPONATREMIA: Status: ACTIVE | Noted: 2022-08-22

## 2022-08-22 PROBLEM — R73.9 HYPERGLYCEMIA: Status: ACTIVE | Noted: 2022-08-22

## 2022-08-22 LAB
ALBUMIN SERPL-MCNC: 3.8 G/DL (ref 3.4–5)
ALBUMIN/GLOB SERPL: 1.3 {RATIO} (ref 1–2)
ALP LIVER SERPL-CCNC: 91 U/L
ALT SERPL-CCNC: 17 U/L
ANION GAP SERPL CALC-SCNC: 5 MMOL/L (ref 0–18)
AST SERPL-CCNC: 13 U/L (ref 15–37)
ATRIAL RATE: 266 BPM
BASOPHILS # BLD AUTO: 0.04 X10(3) UL (ref 0–0.2)
BASOPHILS NFR BLD AUTO: 0.4 %
BILIRUB SERPL-MCNC: 1.1 MG/DL (ref 0.1–2)
BILIRUB UR QL STRIP.AUTO: NEGATIVE
BUN BLD-MCNC: 33 MG/DL (ref 7–18)
CALCIUM BLD-MCNC: 9.6 MG/DL (ref 8.5–10.1)
CHLORIDE SERPL-SCNC: 97 MMOL/L (ref 98–112)
CLARITY UR REFRACT.AUTO: CLEAR
CO2 SERPL-SCNC: 29 MMOL/L (ref 21–32)
COLOR UR AUTO: YELLOW
CREAT BLD-MCNC: 1.09 MG/DL
EOSINOPHIL # BLD AUTO: 0.11 X10(3) UL (ref 0–0.7)
EOSINOPHIL NFR BLD AUTO: 1 %
ERYTHROCYTE [DISTWIDTH] IN BLOOD BY AUTOMATED COUNT: 11.8 %
GFR SERPLBLD BASED ON 1.73 SQ M-ARVRAT: 48 ML/MIN/1.73M2 (ref 60–?)
GLOBULIN PLAS-MCNC: 3 G/DL (ref 2.8–4.4)
GLUCOSE BLD-MCNC: 213 MG/DL (ref 70–99)
GLUCOSE BLD-MCNC: 350 MG/DL (ref 70–99)
GLUCOSE UR STRIP.AUTO-MCNC: NEGATIVE MG/DL
HCT VFR BLD AUTO: 34.9 %
HGB BLD-MCNC: 12.1 G/DL
IMM GRANULOCYTES # BLD AUTO: 0.08 X10(3) UL (ref 0–1)
IMM GRANULOCYTES NFR BLD: 0.7 %
KETONES UR STRIP.AUTO-MCNC: NEGATIVE MG/DL
LYMPHOCYTES # BLD AUTO: 2.09 X10(3) UL (ref 1–4)
LYMPHOCYTES NFR BLD AUTO: 18.3 %
MCH RBC QN AUTO: 32.9 PG (ref 26–34)
MCHC RBC AUTO-ENTMCNC: 34.7 G/DL (ref 31–37)
MCV RBC AUTO: 94.8 FL
MONOCYTES # BLD AUTO: 0.85 X10(3) UL (ref 0.1–1)
MONOCYTES NFR BLD AUTO: 7.4 %
NEUTROPHILS # BLD AUTO: 8.25 X10 (3) UL (ref 1.5–7.7)
NEUTROPHILS # BLD AUTO: 8.25 X10(3) UL (ref 1.5–7.7)
NEUTROPHILS NFR BLD AUTO: 72.2 %
NITRITE UR QL STRIP.AUTO: NEGATIVE
OSMOLALITY SERPL CALC.SUM OF ELEC: 286 MOSM/KG (ref 275–295)
P AXIS: 131 DEGREES
PH UR STRIP.AUTO: 7 [PH] (ref 5–8)
PLATELET # BLD AUTO: 243 10(3)UL (ref 150–450)
POTASSIUM SERPL-SCNC: 3.9 MMOL/L (ref 3.5–5.1)
PROT SERPL-MCNC: 6.8 G/DL (ref 6.4–8.2)
PROT UR STRIP.AUTO-MCNC: NEGATIVE MG/DL
Q-T INTERVAL: 246 MS
QRS DURATION: 54 MS
QTC CALCULATION (BEZET): 366 MS
R AXIS: -22 DEGREES
RBC # BLD AUTO: 3.68 X10(6)UL
RBC UR QL AUTO: NEGATIVE
SARS-COV-2 RNA RESP QL NAA+PROBE: NOT DETECTED
SODIUM SERPL-SCNC: 131 MMOL/L (ref 136–145)
SP GR UR STRIP.AUTO: 1.01 (ref 1–1.03)
T AXIS: 261 DEGREES
TSI SER-ACNC: 1.94 MIU/ML (ref 0.36–3.74)
UROBILINOGEN UR STRIP.AUTO-MCNC: 0.2 MG/DL
VENTRICULAR RATE: 133 BPM
WBC # BLD AUTO: 11.4 X10(3) UL (ref 4–11)

## 2022-08-22 PROCEDURE — 82962 GLUCOSE BLOOD TEST: CPT

## 2022-08-22 PROCEDURE — 81001 URINALYSIS AUTO W/SCOPE: CPT | Performed by: EMERGENCY MEDICINE

## 2022-08-22 PROCEDURE — 87086 URINE CULTURE/COLONY COUNT: CPT | Performed by: EMERGENCY MEDICINE

## 2022-08-22 PROCEDURE — 93010 ELECTROCARDIOGRAM REPORT: CPT

## 2022-08-22 PROCEDURE — 70450 CT HEAD/BRAIN W/O DYE: CPT | Performed by: EMERGENCY MEDICINE

## 2022-08-22 PROCEDURE — 99285 EMERGENCY DEPT VISIT HI MDM: CPT

## 2022-08-22 PROCEDURE — 96361 HYDRATE IV INFUSION ADD-ON: CPT

## 2022-08-22 PROCEDURE — 85025 COMPLETE CBC W/AUTO DIFF WBC: CPT | Performed by: EMERGENCY MEDICINE

## 2022-08-22 PROCEDURE — 96366 THER/PROPH/DIAG IV INF ADDON: CPT

## 2022-08-22 PROCEDURE — 96365 THER/PROPH/DIAG IV INF INIT: CPT

## 2022-08-22 PROCEDURE — 84443 ASSAY THYROID STIM HORMONE: CPT | Performed by: INTERNAL MEDICINE

## 2022-08-22 PROCEDURE — 80053 COMPREHEN METABOLIC PANEL: CPT | Performed by: EMERGENCY MEDICINE

## 2022-08-22 PROCEDURE — 81015 MICROSCOPIC EXAM OF URINE: CPT | Performed by: EMERGENCY MEDICINE

## 2022-08-22 PROCEDURE — 93005 ELECTROCARDIOGRAM TRACING: CPT

## 2022-08-22 RX ORDER — DOCUSATE SODIUM 100 MG/1
100 CAPSULE, LIQUID FILLED ORAL EVERY OTHER DAY
Status: DISCONTINUED | OUTPATIENT
Start: 2022-08-23 | End: 2022-08-24

## 2022-08-22 RX ORDER — MELATONIN
3 NIGHTLY
Status: DISCONTINUED | OUTPATIENT
Start: 2022-08-22 | End: 2022-08-24

## 2022-08-22 RX ORDER — SODIUM CHLORIDE 9 MG/ML
125 INJECTION, SOLUTION INTRAVENOUS CONTINUOUS
Status: DISCONTINUED | OUTPATIENT
Start: 2022-08-22 | End: 2022-08-22

## 2022-08-22 RX ORDER — GABAPENTIN 100 MG/1
100 CAPSULE ORAL 2 TIMES DAILY
Status: DISCONTINUED | OUTPATIENT
Start: 2022-08-22 | End: 2022-08-24

## 2022-08-22 RX ORDER — ACETAMINOPHEN 325 MG/1
650 TABLET ORAL 2 TIMES DAILY
Status: DISCONTINUED | OUTPATIENT
Start: 2022-08-22 | End: 2022-08-24

## 2022-08-22 RX ORDER — SODIUM CHLORIDE 9 MG/ML
INJECTION, SOLUTION INTRAVENOUS CONTINUOUS
Status: DISCONTINUED | OUTPATIENT
Start: 2022-08-22 | End: 2022-08-23

## 2022-08-22 RX ORDER — ATORVASTATIN CALCIUM 20 MG/1
20 TABLET, FILM COATED ORAL NIGHTLY
Status: DISCONTINUED | OUTPATIENT
Start: 2022-08-22 | End: 2022-08-24

## 2022-08-22 RX ORDER — DULOXETIN HYDROCHLORIDE 30 MG/1
30 CAPSULE, DELAYED RELEASE ORAL DAILY
Status: DISCONTINUED | OUTPATIENT
Start: 2022-08-23 | End: 2022-08-24

## 2022-08-22 NOTE — ED INITIAL ASSESSMENT (HPI)
Pt to the ER via EMS from NH for AMS. Nurse at the [address] noted that patient was not acting herself. Pt was dx with a UTI a week ago and has been on ABX. Pt presents a/ox3. Pt slow to answer but answers questions appropriately.  Resp even and nnolabored

## 2022-08-22 NOTE — ED QUICK NOTES
Call from [facility]/Shruti/RN - called to report pt's family just contacted staff regarding exposure to covid past weekend.

## 2022-08-23 ENCOUNTER — TELEPHONE (OUTPATIENT)
Dept: NEUROLOGY | Facility: CLINIC | Age: 87
End: 2022-08-23

## 2022-08-23 LAB
ALBUMIN SERPL-MCNC: 3.3 G/DL (ref 3.4–5)
ALBUMIN/GLOB SERPL: 1.1 {RATIO} (ref 1–2)
ALP LIVER SERPL-CCNC: 75 U/L
ALT SERPL-CCNC: 16 U/L
ANION GAP SERPL CALC-SCNC: 2 MMOL/L (ref 0–18)
AST SERPL-CCNC: 11 U/L (ref 15–37)
ATRIAL RATE: 254 BPM
BASOPHILS # BLD AUTO: 0.04 X10(3) UL (ref 0–0.2)
BASOPHILS NFR BLD AUTO: 0.3 %
BILIRUB SERPL-MCNC: 1.2 MG/DL (ref 0.1–2)
BUN BLD-MCNC: 26 MG/DL (ref 7–18)
CALCIUM BLD-MCNC: 8.7 MG/DL (ref 8.5–10.1)
CHLORIDE SERPL-SCNC: 104 MMOL/L (ref 98–112)
CO2 SERPL-SCNC: 29 MMOL/L (ref 21–32)
CREAT BLD-MCNC: 0.88 MG/DL
EOSINOPHIL # BLD AUTO: 0.07 X10(3) UL (ref 0–0.7)
EOSINOPHIL NFR BLD AUTO: 0.6 %
ERYTHROCYTE [DISTWIDTH] IN BLOOD BY AUTOMATED COUNT: 11.8 %
GFR SERPLBLD BASED ON 1.73 SQ M-ARVRAT: 62 ML/MIN/1.73M2 (ref 60–?)
GLOBULIN PLAS-MCNC: 2.9 G/DL (ref 2.8–4.4)
GLUCOSE BLD-MCNC: 149 MG/DL (ref 70–99)
GLUCOSE BLD-MCNC: 160 MG/DL (ref 70–99)
GLUCOSE BLD-MCNC: 175 MG/DL (ref 70–99)
GLUCOSE BLD-MCNC: 213 MG/DL (ref 70–99)
GLUCOSE BLD-MCNC: 214 MG/DL (ref 70–99)
GLUCOSE BLD-MCNC: 240 MG/DL (ref 70–99)
HCT VFR BLD AUTO: 34.6 %
HGB BLD-MCNC: 11.7 G/DL
IMM GRANULOCYTES # BLD AUTO: 0.11 X10(3) UL (ref 0–1)
IMM GRANULOCYTES NFR BLD: 0.9 %
LYMPHOCYTES # BLD AUTO: 1.34 X10(3) UL (ref 1–4)
LYMPHOCYTES NFR BLD AUTO: 11.6 %
MCH RBC QN AUTO: 33.1 PG (ref 26–34)
MCHC RBC AUTO-ENTMCNC: 33.8 G/DL (ref 31–37)
MCV RBC AUTO: 97.7 FL
MONOCYTES # BLD AUTO: 0.78 X10(3) UL (ref 0.1–1)
MONOCYTES NFR BLD AUTO: 6.7 %
NEUTROPHILS # BLD AUTO: 9.24 X10 (3) UL (ref 1.5–7.7)
NEUTROPHILS # BLD AUTO: 9.24 X10(3) UL (ref 1.5–7.7)
NEUTROPHILS NFR BLD AUTO: 79.9 %
OSMOLALITY SERPL CALC.SUM OF ELEC: 289 MOSM/KG (ref 275–295)
PLATELET # BLD AUTO: 231 10(3)UL (ref 150–450)
POTASSIUM SERPL-SCNC: 3.5 MMOL/L (ref 3.5–5.1)
PROT SERPL-MCNC: 6.2 G/DL (ref 6.4–8.2)
Q-T INTERVAL: 196 MS
QRS DURATION: 64 MS
QTC CALCULATION (BEZET): 228 MS
R AXIS: 1 DEGREES
RBC # BLD AUTO: 3.54 X10(6)UL
SODIUM SERPL-SCNC: 135 MMOL/L (ref 136–145)
T AXIS: 94 DEGREES
VENTRICULAR RATE: 82 BPM
WBC # BLD AUTO: 11.6 X10(3) UL (ref 4–11)

## 2022-08-23 PROCEDURE — 82962 GLUCOSE BLOOD TEST: CPT

## 2022-08-23 PROCEDURE — 97535 SELF CARE MNGMENT TRAINING: CPT

## 2022-08-23 PROCEDURE — 80053 COMPREHEN METABOLIC PANEL: CPT | Performed by: INTERNAL MEDICINE

## 2022-08-23 PROCEDURE — 93005 ELECTROCARDIOGRAM TRACING: CPT

## 2022-08-23 PROCEDURE — 97530 THERAPEUTIC ACTIVITIES: CPT

## 2022-08-23 PROCEDURE — 93010 ELECTROCARDIOGRAM REPORT: CPT | Performed by: INTERNAL MEDICINE

## 2022-08-23 PROCEDURE — 85025 COMPLETE CBC W/AUTO DIFF WBC: CPT | Performed by: INTERNAL MEDICINE

## 2022-08-23 PROCEDURE — 97165 OT EVAL LOW COMPLEX 30 MIN: CPT

## 2022-08-23 PROCEDURE — 97162 PT EVAL MOD COMPLEX 30 MIN: CPT

## 2022-08-23 RX ORDER — FUROSEMIDE 40 MG/1
40 TABLET ORAL DAILY
Status: DISCONTINUED | OUTPATIENT
Start: 2022-08-23 | End: 2022-08-24

## 2022-08-23 RX ORDER — DILTIAZEM HYDROCHLORIDE 120 MG/1
120 CAPSULE, EXTENDED RELEASE ORAL DAILY
Status: DISCONTINUED | OUTPATIENT
Start: 2022-08-23 | End: 2022-08-23

## 2022-08-23 RX ORDER — POTASSIUM CHLORIDE 20 MEQ/1
20 TABLET, EXTENDED RELEASE ORAL DAILY
Status: DISCONTINUED | OUTPATIENT
Start: 2022-08-23 | End: 2022-08-24

## 2022-08-23 RX ORDER — ONDANSETRON 4 MG/1
4 TABLET, ORALLY DISINTEGRATING ORAL EVERY 4 HOURS PRN
COMMUNITY

## 2022-08-23 RX ORDER — POLYETHYLENE GLYCOL 3350 17 G/17G
17 POWDER, FOR SOLUTION ORAL DAILY PRN
COMMUNITY

## 2022-08-23 RX ORDER — TRAMADOL HYDROCHLORIDE 50 MG/1
50 TABLET ORAL 2 TIMES DAILY PRN
COMMUNITY
End: 2022-08-24

## 2022-08-23 RX ORDER — SENNOSIDES 8.6 MG
1950 CAPSULE ORAL 3 TIMES DAILY
COMMUNITY

## 2022-08-23 RX ORDER — DILTIAZEM HYDROCHLORIDE 120 MG/1
120 CAPSULE, EXTENDED RELEASE ORAL 2 TIMES DAILY
Status: DISCONTINUED | OUTPATIENT
Start: 2022-08-23 | End: 2022-08-24

## 2022-08-23 RX ORDER — POTASSIUM CHLORIDE 20 MEQ/1
20 TABLET, EXTENDED RELEASE ORAL DAILY
Qty: 30 TABLET | Refills: 1 | Status: SHIPPED | OUTPATIENT
Start: 2022-08-24

## 2022-08-23 NOTE — PLAN OF CARE
Pt AxO2-3  -forgetful, confused  RA  Afib/Aflutter  -cardz gtt on HOLD r/t to low HR during PM   =started PO today per cards   =restarted gtt in afternoon    -HR 130s at rest  Pain present; RUE/shoulder  -mild/moderate; tylenol gives relief  2x assist with walker/total assist  -per family, wheelchair bound at [facility]  -PT/OT evaluated  -up to chair    EKG completed  Monitor/manage BG level  Monitor/manage HR/rhythm level  IV fluids  -dc  Pt and family informed of POC, all questions answered

## 2022-08-23 NOTE — TELEPHONE ENCOUNTER
TIA CLINIC SCREENING    1. Date of ED visit/TIA diagnosis:  08/23/2022   Time of discharge from ED:  admitted    2. Is patient currently admitted? Yes   If YES - TIA Clinic Appointment not required. 3. Does patient already see an [facility] neurologist?  Yes  Name:     (if YES - TIA Clinic Appointment not required. Route message on to patient's neurologist)    4. Patient's current anti-platelet therapy:  Eliquis    5. Patient's current statin therapy:  Atorvastatin      6. Has 2D Echo with bubble test been done? No  Date:      7. Is TIA Clinic Appointment indicated? No     If YES - route encounter to [address] [place] [address] to schedule patient for appointment NO LATER THAN 48 HOURS AFTER ED DISCHARGE. If UNSURE - route encounter to clinic provider for recommendation.      If NO - indicate reason and close encounter:  Pt currently admitted

## 2022-08-23 NOTE — PLAN OF CARE
Assumed care 1900  Family at bedside for support  Confused at times  Speech clear  afib rates controlled, drip turned off due to low rates, cards APN notified  BP stable  Fluids running

## 2022-08-24 VITALS
DIASTOLIC BLOOD PRESSURE: 60 MMHG | HEART RATE: 83 BPM | TEMPERATURE: 98 F | SYSTOLIC BLOOD PRESSURE: 135 MMHG | RESPIRATION RATE: 14 BRPM | OXYGEN SATURATION: 97 %

## 2022-08-24 LAB
GLUCOSE BLD-MCNC: 119 MG/DL (ref 70–99)
GLUCOSE BLD-MCNC: 220 MG/DL (ref 70–99)
POTASSIUM SERPL-SCNC: 4 MMOL/L (ref 3.5–5.1)

## 2022-08-24 PROCEDURE — 84132 ASSAY OF SERUM POTASSIUM: CPT | Performed by: INTERNAL MEDICINE

## 2022-08-24 PROCEDURE — 82962 GLUCOSE BLOOD TEST: CPT

## 2022-08-24 RX ORDER — SENNOSIDES 8.6 MG
650 CAPSULE ORAL 3 TIMES DAILY
Status: SHIPPED | COMMUNITY
Start: 2022-08-24

## 2022-08-24 NOTE — CM/SW NOTE
CM and RN discussed patient's discharge needs in rounds. [facility] is from [facility] [address] in [facility]. CM called facility and spoke to RN Nadia Slaughter with discharge update; CM informed patient can return to facility any time today. RN updated and instructed to call transfer report to facility at [phone]. PT eval=home with supervision    DC Plan: [facility] assisted, family to transport patient.       Marquis Corral, RN Case Manager [phone]

## 2022-08-24 NOTE — DISCHARGE SUMMARY
[facility]  Discharge Summary    Haim Borja Patient Status:  Observation    10/30/1931 MRN [id number]   [facility] 7NE-A Attending No att. providers found   Hosp Day # 0 PCP Malina Pierce MD     Date of Admission: 2022    Date of Discharge: 2022  2:14 PM    Admitting Diagnosis: TIA (transient ischemic attack) [G45.9]  Atrial fibrillation with rapid ventricular response ([address] [I48.91]    Discharge Diagnosis:    Aflutter with Rapid ventricular rate - on Cardizem and    Eliquis   Mod MR and TR and pul HTN   Slurred speech and increased lethargy - resolved , ? TIA   DM - controlled on oral hypoglycemics   HTN -   OA of multiple joints- S/p joint injections       Patient Active Problem List:     Type 2 diabetes mellitus without complication, without long-term current use of insulin (HCC)     Benign essential HTN     Osteoarthritis     Rosacea     HYPERLIPIDEMIA     BCC, SCC, S/P SKIN FLAP ON FACE     OSTEOPENIA     INSOMNIA      Urge incontinence     Shingles     VITAMIN D DEFICIENCY     Complete rupture of rotator cuff     L rotator cuff disorder, osteoarthritis     Cervicalgia     Primary localized osteoarthrosis, lower leg     S/P total knee arthroplasty     Pain in joint, lower leg, left     Closed displaced intertrochanteric fracture of left femur with routine healing, subsequent encounter     Acute pain of left knee     Status post total left knee replacement     Trochanteric bursitis of right hip     Atrial flutter with rapid ventricular response (HCC)     Contusion of scalp, initial encounter     Therapeutic drug monitoring     Anxiety     HTN (hypertension)     Onychocryptosis     Senile osteoporosis     Shoulder weakness     Primary osteoarthritis of right knee     Bilateral shoulder region arthritis     Dementia without behavioral disturbance ([address]     Osteoarthrosis, unspecified whether generalized or localized, unspecified site     Hyponatremia     Azotemia Hyperglycemia     Atrial fibrillation with rapid ventricular response (HCC)     TIA (transient ischemic attack)      Reason for Admission: Increased lethargy and slurred speech     Physical Exam: See progress note            Disposition: Home     Discharge Condition: Stable    Discharge Medications: Discharge Medication List as of 8/24/2022 12:12 PM    START taking these medications    potassium chloride 20 MEQ Oral Tab CR  Take 1 tablet (20 mEq total) by mouth daily. , Normal, Disp-30 tablet, R-1      CONTINUE these medications which have CHANGED    metFORMIN 1000 MG Oral Tab  Take 1 tablet (1,000 mg total) by mouth 2 (two) times daily with meals. , Normal, Disp-180 tablet, R-0    !! Acetaminophen ER (TYLENOL 8 HOUR ARTHRITIS PAIN) 650 MG Oral Tab CR  Take 1 tablet (650 mg total) by mouth in the morning, at noon, and at bedtime. , Historical    !! - Potential duplicate medications found. Please discuss with provider. CONTINUE these medications which have NOT CHANGED    !! Acetaminophen  MG Oral Tab CR  Take 1,950 mg by mouth 3 (three) times daily. Max 3 gm apap /24 hours from all sources, Historical    ondansetron 4 MG Oral Tablet Dispersible  Take 4 mg by mouth every 4 (four) hours as needed for Nausea., Historical    polyethylene glycol, PEG 3350, 17 g Oral Powd Pack  Take 17 g by mouth daily as needed., Historical    DILTIAZEM  MG Oral Capsule SR 24 Hr  TAKE 1 CAPSULE BY MOUTH TWICE DAILY, Normal, Disp-90 capsule, R-0    diclofenac 1 % External Gel  Apply 2 g topically 4 (four) times daily. , Normal, Disp-150 g, R-2    cyanocobalamine 1000 MCG Oral Tab  Take 1,000 mcg by mouth daily. , Historical    FUROSEMIDE 40 MG Oral Tab  TAKE 1 TABLET BY MOUTH DAILY, Normal, Disp-30 tablet, R-3    atorvastatin 20 MG Oral Tab  Take 1 tablet (20 mg total) by mouth nightly., Normal, Disp-30 tablet, R-11    Calcium Carbonate-Vitamin D 250-125 MG-UNIT Oral Tab  Take 1 tablet by mouth 2 (two) times daily. , Normal, Disp-60 tablet, R-11    DULoxetine 30 MG Oral Cap DR Particles  Take 1 capsule (30 mg total) by mouth daily. , Normal, Disp-90 capsule, R-3    apixaban (ELIQUIS) 2.5 MG Oral Tab  Take 1 tablet (2.5 mg total) by mouth 2 (two) times daily. , Normal, Disp-60 tablet, R-5    gabapentin 100 MG Oral Cap  Take 1 capsule (100 mg total) by mouth 2 (two) times daily. , Normal, Disp-180 capsule, R-3    docusate sodium 100 MG Oral Cap  Take 1 capsule (100 mg total) by mouth every other day., Normal, Disp-30 capsule, R-5    melatonin 3 MG Oral Tab  Take 1 tablet (3 mg total) by mouth nightly., Normal, Disp-30 tablet, R-11    Menthol, Topical Analgesic, (BIOFREEZE ROLL-ON) 4 % External Gel  Apply 1 Application topically 2 (two) times a day. APPLY A THIN FILM OF BIOFREEZE ON THE SKIN OVER A PAINFUL MUSCLE OR AN ACHING JOINT AS MANY AS FOUR TIMES JORGE, Normal, Disp-150 mL, R-5    metoprolol tartrate 25 MG Oral Tab  Take 1 tablet (25 mg total) by mouth 2 (two) times daily. , Normal, Disp-180 tablet, R-3    Ferrous Sulfate (FEROSUL) 325 (65 Fe) MG Oral Tab  Take 1 tablet (325 mg total) by mouth daily with breakfast., Normal, Disp-90 tablet, R-3    Multiple Vitamins-Minerals (PRESERVISION AREDS) Oral Tab  Take 1 tablet by mouth 2 (two) times a day., Historical    !! - Potential duplicate medications found. Please discuss with provider. STOP taking these medications    traMADol 50 MG Oral Tab          Follow up Visits: Follow-up with Physicians as directed.         Diana Steven MD  8/24/2022  6:34 PM

## 2022-08-24 NOTE — PLAN OF CARE
NURSING DISCHARGE NOTE    Discharged Nursing home via Wheelchair. Accompanied by Support staff  Belongings Taken by patient/family. Pt AxO2-3  -forgetful, confused  RA  Afib/Aflutter  -cardz gtt HOLD in PM; low HR  No pain at this time  2x assist with walker/total assist  -per family, wheelchair bound at [facility]  -up to chair    Monitor/manage BG level  Monitor/manage HR/rhythm level  Pt and family informed of POC, all questions answered    Report given Jose Antonio Haro RN at [facility] reason pt was admitted, current VS, and current status. Informed of new medications. All questions answered. Pt and family given and informed of discharge instructions, new medications, home medications, f/u dates, and medications to stop. Pt family given extra copy of dc instructions. Pt family informed of when to take the next dose of medications and changes of home medications. Pt and family verbally understand discharge instructions, all questions answered.

## 2022-09-08 ENCOUNTER — LAB REQUISITION (OUTPATIENT)
Dept: LAB | Facility: HOSPITAL | Age: 87
End: 2022-09-08
Payer: MEDICARE

## 2022-09-08 DIAGNOSIS — N39.0 URINARY TRACT INFECTION, SITE NOT SPECIFIED: ICD-10-CM

## 2022-09-08 DIAGNOSIS — I10 ESSENTIAL (PRIMARY) HYPERTENSION: ICD-10-CM

## 2022-09-08 LAB
ANION GAP SERPL CALC-SCNC: 7 MMOL/L (ref 0–18)
BASOPHILS # BLD AUTO: 0.05 X10(3) UL (ref 0–0.2)
BASOPHILS NFR BLD AUTO: 0.5 %
BILIRUB UR QL STRIP.AUTO: NEGATIVE
BUN BLD-MCNC: 22 MG/DL (ref 7–18)
CALCIUM BLD-MCNC: 9.8 MG/DL (ref 8.5–10.1)
CHLORIDE SERPL-SCNC: 105 MMOL/L (ref 98–112)
CLARITY UR REFRACT.AUTO: CLEAR
CO2 SERPL-SCNC: 23 MMOL/L (ref 21–32)
COLOR UR AUTO: YELLOW
CREAT BLD-MCNC: 1.07 MG/DL
EOSINOPHIL # BLD AUTO: 0.09 X10(3) UL (ref 0–0.7)
EOSINOPHIL NFR BLD AUTO: 0.9 %
ERYTHROCYTE [DISTWIDTH] IN BLOOD BY AUTOMATED COUNT: 12.2 %
GFR SERPLBLD BASED ON 1.73 SQ M-ARVRAT: 49 ML/MIN/1.73M2 (ref 60–?)
GLUCOSE BLD-MCNC: 143 MG/DL (ref 70–99)
GLUCOSE UR STRIP.AUTO-MCNC: NEGATIVE MG/DL
HCT VFR BLD AUTO: 37.2 %
HGB BLD-MCNC: 12.6 G/DL
IMM GRANULOCYTES # BLD AUTO: 0.04 X10(3) UL (ref 0–1)
IMM GRANULOCYTES NFR BLD: 0.4 %
KETONES UR STRIP.AUTO-MCNC: NEGATIVE MG/DL
LYMPHOCYTES # BLD AUTO: 1.96 X10(3) UL (ref 1–4)
LYMPHOCYTES NFR BLD AUTO: 20.4 %
MCH RBC QN AUTO: 33.2 PG (ref 26–34)
MCHC RBC AUTO-ENTMCNC: 33.9 G/DL (ref 31–37)
MCV RBC AUTO: 97.9 FL
MONOCYTES # BLD AUTO: 0.69 X10(3) UL (ref 0.1–1)
MONOCYTES NFR BLD AUTO: 7.2 %
NEUTROPHILS # BLD AUTO: 6.78 X10 (3) UL (ref 1.5–7.7)
NEUTROPHILS # BLD AUTO: 6.78 X10(3) UL (ref 1.5–7.7)
NEUTROPHILS NFR BLD AUTO: 70.6 %
NITRITE UR QL STRIP.AUTO: NEGATIVE
OSMOLALITY SERPL CALC.SUM OF ELEC: 286 MOSM/KG (ref 275–295)
PH UR STRIP.AUTO: 6 [PH] (ref 5–8)
PLATELET # BLD AUTO: 225 10(3)UL (ref 150–450)
POTASSIUM SERPL-SCNC: 4.8 MMOL/L (ref 3.5–5.1)
PROT UR STRIP.AUTO-MCNC: NEGATIVE MG/DL
RBC # BLD AUTO: 3.8 X10(6)UL
RBC UR QL AUTO: NEGATIVE
SODIUM SERPL-SCNC: 135 MMOL/L (ref 136–145)
SP GR UR STRIP.AUTO: 1.01 (ref 1–1.03)
UROBILINOGEN UR STRIP.AUTO-MCNC: 0.2 MG/DL
WBC # BLD AUTO: 9.6 X10(3) UL (ref 4–11)

## 2022-09-08 PROCEDURE — 85025 COMPLETE CBC W/AUTO DIFF WBC: CPT | Performed by: INTERNAL MEDICINE

## 2022-09-08 PROCEDURE — 87086 URINE CULTURE/COLONY COUNT: CPT | Performed by: INTERNAL MEDICINE

## 2022-09-08 PROCEDURE — 81015 MICROSCOPIC EXAM OF URINE: CPT | Performed by: INTERNAL MEDICINE

## 2022-09-08 PROCEDURE — 81001 URINALYSIS AUTO W/SCOPE: CPT | Performed by: INTERNAL MEDICINE

## 2022-09-08 PROCEDURE — 80048 BASIC METABOLIC PNL TOTAL CA: CPT | Performed by: INTERNAL MEDICINE

## 2022-09-12 PROBLEM — R44.3 HALLUCINATION: Status: ACTIVE | Noted: 2022-09-12

## 2022-09-12 PROBLEM — I48.20 CHRONIC ATRIAL FIBRILLATION WITH RVR (HCC): Status: ACTIVE | Noted: 2022-09-12

## 2022-09-12 PROBLEM — F02.818 LATE ONSET ALZHEIMER'S DEMENTIA WITH BEHAVIORAL DISTURBANCE (HCC): Status: ACTIVE | Noted: 2022-09-12

## 2022-09-12 PROBLEM — F02.81 LATE ONSET ALZHEIMER'S DEMENTIA WITH BEHAVIORAL DISTURBANCE (HCC): Status: ACTIVE | Noted: 2022-09-12

## 2022-09-12 PROBLEM — G30.1 LATE ONSET ALZHEIMER'S DEMENTIA WITH BEHAVIORAL DISTURBANCE (HCC): Status: ACTIVE | Noted: 2022-09-12

## 2022-09-15 PROBLEM — B37.0 THRUSH OF MOUTH AND ESOPHAGUS (HCC): Status: ACTIVE | Noted: 2022-09-15

## 2022-09-15 PROBLEM — B37.81 THRUSH OF MOUTH AND ESOPHAGUS  (HCC): Status: ACTIVE | Noted: 2022-09-15

## 2022-09-15 PROBLEM — B37.81 THRUSH OF MOUTH AND ESOPHAGUS (HCC): Status: ACTIVE | Noted: 2022-09-15

## 2022-09-15 PROBLEM — R26.81 UNSTEADY GAIT: Status: ACTIVE | Noted: 2022-09-15

## 2022-09-15 PROBLEM — B37.0 THRUSH OF MOUTH AND ESOPHAGUS  (HCC): Status: ACTIVE | Noted: 2022-09-15

## 2022-09-15 PROBLEM — F02.80 LATE ONSET ALZHEIMER'S DEMENTIA WITHOUT BEHAVIORAL DISTURBANCE (HCC): Status: ACTIVE | Noted: 2022-09-12

## 2022-09-15 PROBLEM — G30.1 LATE ONSET ALZHEIMER'S DEMENTIA WITHOUT BEHAVIORAL DISTURBANCE (HCC): Status: ACTIVE | Noted: 2022-09-12

## 2022-09-22 ENCOUNTER — LAB REQUISITION (OUTPATIENT)
Dept: LAB | Facility: HOSPITAL | Age: 87
End: 2022-09-22
Payer: MEDICARE

## 2022-09-22 DIAGNOSIS — I10 ESSENTIAL (PRIMARY) HYPERTENSION: ICD-10-CM

## 2022-09-22 LAB
ANION GAP SERPL CALC-SCNC: 7 MMOL/L (ref 0–18)
BUN BLD-MCNC: 29 MG/DL (ref 7–18)
CALCIUM BLD-MCNC: 9 MG/DL (ref 8.5–10.1)
CHLORIDE SERPL-SCNC: 112 MMOL/L (ref 98–112)
CO2 SERPL-SCNC: 20 MMOL/L (ref 21–32)
CREAT BLD-MCNC: 1.27 MG/DL
GFR SERPLBLD BASED ON 1.73 SQ M-ARVRAT: 40 ML/MIN/1.73M2 (ref 60–?)
GLUCOSE BLD-MCNC: 198 MG/DL (ref 70–99)
OSMOLALITY SERPL CALC.SUM OF ELEC: 299 MOSM/KG (ref 275–295)
POTASSIUM SERPL-SCNC: 5.3 MMOL/L (ref 3.5–5.1)
SODIUM SERPL-SCNC: 139 MMOL/L (ref 136–145)

## 2022-09-22 PROCEDURE — 80048 BASIC METABOLIC PNL TOTAL CA: CPT | Performed by: INTERNAL MEDICINE

## 2022-09-24 ENCOUNTER — HOSPITAL ENCOUNTER (INPATIENT)
Facility: HOSPITAL | Age: 87
LOS: 1 days | Discharge: NURSING, REHAB OR LONG-TERM CARE | End: 2022-09-25
Attending: EMERGENCY MEDICINE | Admitting: INTERNAL MEDICINE
Payer: MEDICARE

## 2022-09-24 ENCOUNTER — APPOINTMENT (OUTPATIENT)
Dept: CT IMAGING | Facility: HOSPITAL | Age: 87
End: 2022-09-24
Attending: EMERGENCY MEDICINE
Payer: MEDICARE

## 2022-09-24 DIAGNOSIS — N17.9 AKI (ACUTE KIDNEY INJURY) (HCC): ICD-10-CM

## 2022-09-24 DIAGNOSIS — N30.01 ACUTE CYSTITIS WITH HEMATURIA: ICD-10-CM

## 2022-09-24 DIAGNOSIS — R55 SYNCOPE AND COLLAPSE: Primary | ICD-10-CM

## 2022-09-24 DIAGNOSIS — E87.5 HYPERKALEMIA: ICD-10-CM

## 2022-09-24 LAB
ALBUMIN SERPL-MCNC: 3.2 G/DL (ref 3.4–5)
ALBUMIN/GLOB SERPL: 0.9 {RATIO} (ref 1–2)
ALP LIVER SERPL-CCNC: 92 U/L
ALT SERPL-CCNC: 15 U/L
ANION GAP SERPL CALC-SCNC: 5 MMOL/L (ref 0–18)
ANTIBODY SCREEN: NEGATIVE
APTT PPP: 28.8 SECONDS (ref 23.3–35.6)
AST SERPL-CCNC: 9 U/L (ref 15–37)
BASOPHILS # BLD AUTO: 0.06 X10(3) UL (ref 0–0.2)
BASOPHILS NFR BLD AUTO: 0.6 %
BILIRUB SERPL-MCNC: 0.5 MG/DL (ref 0.1–2)
BILIRUB UR QL STRIP.AUTO: NEGATIVE
BUN BLD-MCNC: 37 MG/DL (ref 7–18)
CALCIUM BLD-MCNC: 9.6 MG/DL (ref 8.5–10.1)
CHLORIDE SERPL-SCNC: 110 MMOL/L (ref 98–112)
CO2 SERPL-SCNC: 24 MMOL/L (ref 21–32)
COLOR UR AUTO: YELLOW
CREAT BLD-MCNC: 1.22 MG/DL
EOSINOPHIL # BLD AUTO: 0.12 X10(3) UL (ref 0–0.7)
EOSINOPHIL NFR BLD AUTO: 1.3 %
ERYTHROCYTE [DISTWIDTH] IN BLOOD BY AUTOMATED COUNT: 13.4 %
GFR SERPLBLD BASED ON 1.73 SQ M-ARVRAT: 42 ML/MIN/1.73M2 (ref 60–?)
GLOBULIN PLAS-MCNC: 3.5 G/DL (ref 2.8–4.4)
GLUCOSE BLD-MCNC: 284 MG/DL (ref 70–99)
GLUCOSE UR STRIP.AUTO-MCNC: >=500 MG/DL
HCT VFR BLD AUTO: 36.5 %
HGB BLD-MCNC: 12.4 G/DL
IMM GRANULOCYTES # BLD AUTO: 0.08 X10(3) UL (ref 0–1)
IMM GRANULOCYTES NFR BLD: 0.9 %
INR BLD: 1.13 (ref 0.85–1.16)
KETONES UR STRIP.AUTO-MCNC: NEGATIVE MG/DL
LYMPHOCYTES # BLD AUTO: 1.66 X10(3) UL (ref 1–4)
LYMPHOCYTES NFR BLD AUTO: 17.8 %
MCH RBC QN AUTO: 33.4 PG (ref 26–34)
MCHC RBC AUTO-ENTMCNC: 34 G/DL (ref 31–37)
MCV RBC AUTO: 98.4 FL
MONOCYTES # BLD AUTO: 0.92 X10(3) UL (ref 0.1–1)
MONOCYTES NFR BLD AUTO: 9.9 %
NEUTROPHILS # BLD AUTO: 6.49 X10 (3) UL (ref 1.5–7.7)
NEUTROPHILS # BLD AUTO: 6.49 X10(3) UL (ref 1.5–7.7)
NEUTROPHILS NFR BLD AUTO: 69.5 %
NITRITE UR QL STRIP.AUTO: NEGATIVE
OSMOLALITY SERPL CALC.SUM OF ELEC: 307 MOSM/KG (ref 275–295)
PH UR STRIP.AUTO: 5 [PH] (ref 5–8)
PLATELET # BLD AUTO: 244 10(3)UL (ref 150–450)
POTASSIUM SERPL-SCNC: 5.4 MMOL/L (ref 3.5–5.1)
PROT SERPL-MCNC: 6.7 G/DL (ref 6.4–8.2)
PROT UR STRIP.AUTO-MCNC: 100 MG/DL
PROTHROMBIN TIME: 14.5 SECONDS (ref 11.6–14.8)
RBC # BLD AUTO: 3.71 X10(6)UL
RH BLOOD TYPE: POSITIVE
SARS-COV-2 RNA RESP QL NAA+PROBE: NOT DETECTED
SODIUM SERPL-SCNC: 139 MMOL/L (ref 136–145)
SP GR UR STRIP.AUTO: 1.02 (ref 1–1.03)
TROPONIN I HIGH SENSITIVITY: 15 NG/L
UROBILINOGEN UR STRIP.AUTO-MCNC: <2 MG/DL
WBC # BLD AUTO: 9.3 X10(3) UL (ref 4–11)
WBC #/AREA URNS AUTO: >50 /HPF
WBC CLUMPS UR QL AUTO: PRESENT /HPF

## 2022-09-24 PROCEDURE — 99285 EMERGENCY DEPT VISIT HI MDM: CPT

## 2022-09-24 PROCEDURE — 86901 BLOOD TYPING SEROLOGIC RH(D): CPT | Performed by: EMERGENCY MEDICINE

## 2022-09-24 PROCEDURE — 70450 CT HEAD/BRAIN W/O DYE: CPT | Performed by: EMERGENCY MEDICINE

## 2022-09-24 PROCEDURE — 84484 ASSAY OF TROPONIN QUANT: CPT | Performed by: EMERGENCY MEDICINE

## 2022-09-24 PROCEDURE — 85610 PROTHROMBIN TIME: CPT | Performed by: EMERGENCY MEDICINE

## 2022-09-24 PROCEDURE — 86900 BLOOD TYPING SEROLOGIC ABO: CPT | Performed by: EMERGENCY MEDICINE

## 2022-09-24 PROCEDURE — 36415 COLL VENOUS BLD VENIPUNCTURE: CPT

## 2022-09-24 PROCEDURE — 72125 CT NECK SPINE W/O DYE: CPT | Performed by: EMERGENCY MEDICINE

## 2022-09-24 PROCEDURE — 93005 ELECTROCARDIOGRAM TRACING: CPT

## 2022-09-24 PROCEDURE — 80053 COMPREHEN METABOLIC PANEL: CPT | Performed by: EMERGENCY MEDICINE

## 2022-09-24 PROCEDURE — 86850 RBC ANTIBODY SCREEN: CPT | Performed by: EMERGENCY MEDICINE

## 2022-09-24 PROCEDURE — 87040 BLOOD CULTURE FOR BACTERIA: CPT | Performed by: EMERGENCY MEDICINE

## 2022-09-24 PROCEDURE — 81001 URINALYSIS AUTO W/SCOPE: CPT | Performed by: EMERGENCY MEDICINE

## 2022-09-24 PROCEDURE — 87086 URINE CULTURE/COLONY COUNT: CPT | Performed by: EMERGENCY MEDICINE

## 2022-09-24 PROCEDURE — 85730 THROMBOPLASTIN TIME PARTIAL: CPT | Performed by: EMERGENCY MEDICINE

## 2022-09-24 PROCEDURE — 87077 CULTURE AEROBIC IDENTIFY: CPT | Performed by: EMERGENCY MEDICINE

## 2022-09-24 PROCEDURE — 85025 COMPLETE CBC W/AUTO DIFF WBC: CPT | Performed by: EMERGENCY MEDICINE

## 2022-09-24 PROCEDURE — 93010 ELECTROCARDIOGRAM REPORT: CPT

## 2022-09-24 PROCEDURE — 87186 SC STD MICRODIL/AGAR DIL: CPT | Performed by: EMERGENCY MEDICINE

## 2022-09-24 PROCEDURE — 96365 THER/PROPH/DIAG IV INF INIT: CPT

## 2022-09-24 RX ORDER — SODIUM POLYSTYRENE SULFONATE 4.1 MEQ/G
15 POWDER, FOR SUSPENSION ORAL; RECTAL ONCE
Status: COMPLETED | OUTPATIENT
Start: 2022-09-24 | End: 2022-09-25

## 2022-09-25 VITALS
TEMPERATURE: 98 F | WEIGHT: 123.88 LBS | BODY MASS INDEX: 22.8 KG/M2 | HEIGHT: 62 IN | OXYGEN SATURATION: 97 % | SYSTOLIC BLOOD PRESSURE: 91 MMHG | DIASTOLIC BLOOD PRESSURE: 71 MMHG | RESPIRATION RATE: 18 BRPM | HEART RATE: 88 BPM

## 2022-09-25 PROBLEM — N30.01 ACUTE CYSTITIS WITH HEMATURIA: Status: ACTIVE | Noted: 2022-09-25

## 2022-09-25 PROBLEM — N17.9 AKI (ACUTE KIDNEY INJURY) (HCC): Status: ACTIVE | Noted: 2022-09-25

## 2022-09-25 PROBLEM — E87.5 HYPERKALEMIA: Status: ACTIVE | Noted: 2022-09-25

## 2022-09-25 LAB
ATRIAL RATE: 276 BPM
GLUCOSE BLD-MCNC: 189 MG/DL (ref 70–99)
GLUCOSE BLD-MCNC: 223 MG/DL (ref 70–99)
P AXIS: 86 DEGREES
Q-T INTERVAL: 326 MS
QRS DURATION: 64 MS
QTC CALCULATION (BEZET): 403 MS
R AXIS: 39 DEGREES
T AXIS: 152 DEGREES
VENTRICULAR RATE: 92 BPM

## 2022-09-25 PROCEDURE — 82962 GLUCOSE BLOOD TEST: CPT

## 2022-09-25 RX ORDER — GABAPENTIN 100 MG/1
100 CAPSULE ORAL NIGHTLY
Status: DISCONTINUED | OUTPATIENT
Start: 2022-09-25 | End: 2022-09-25

## 2022-09-25 RX ORDER — GABAPENTIN 100 MG/1
100 CAPSULE ORAL NIGHTLY
Status: SHIPPED | COMMUNITY
Start: 2022-09-25 | End: 2022-09-27

## 2022-09-25 RX ORDER — MUSCLE RUB CREAM 100; 150 MG/G; MG/G
CREAM TOPICAL 2 TIMES DAILY
Status: DISCONTINUED | OUTPATIENT
Start: 2022-09-25 | End: 2022-09-25

## 2022-09-25 RX ORDER — DEXTROSE MONOHYDRATE 25 G/50ML
50 INJECTION, SOLUTION INTRAVENOUS
Status: DISCONTINUED | OUTPATIENT
Start: 2022-09-25 | End: 2022-09-25

## 2022-09-25 RX ORDER — CEPHALEXIN 500 MG/1
500 CAPSULE ORAL EVERY 8 HOURS SCHEDULED
Qty: 9 CAPSULE | Refills: 0 | Status: SHIPPED | OUTPATIENT
Start: 2022-09-25 | End: 2022-09-25

## 2022-09-25 RX ORDER — MELATONIN
3 NIGHTLY
Status: DISCONTINUED | OUTPATIENT
Start: 2022-09-25 | End: 2022-09-25

## 2022-09-25 RX ORDER — DULOXETIN HYDROCHLORIDE 30 MG/1
30 CAPSULE, DELAYED RELEASE ORAL DAILY
Status: DISCONTINUED | OUTPATIENT
Start: 2022-09-25 | End: 2022-09-25

## 2022-09-25 RX ORDER — DILTIAZEM HYDROCHLORIDE 120 MG/1
120 CAPSULE, EXTENDED RELEASE ORAL 2 TIMES DAILY
Status: DISCONTINUED | OUTPATIENT
Start: 2022-09-25 | End: 2022-09-25

## 2022-09-25 RX ORDER — ACETAMINOPHEN 325 MG/1
650 TABLET ORAL 3 TIMES DAILY
Status: DISCONTINUED | OUTPATIENT
Start: 2022-09-25 | End: 2022-09-25

## 2022-09-25 RX ORDER — METOPROLOL TARTRATE 50 MG/1
50 TABLET, FILM COATED ORAL
Status: DISCONTINUED | OUTPATIENT
Start: 2022-09-25 | End: 2022-09-25

## 2022-09-25 RX ORDER — MULTIPLE VITAMINS W/ MINERALS TAB 9MG-400MCG
1 TAB ORAL 2 TIMES DAILY
Status: DISCONTINUED | OUTPATIENT
Start: 2022-09-25 | End: 2022-09-25

## 2022-09-25 RX ORDER — GABAPENTIN 100 MG/1
100 CAPSULE ORAL 2 TIMES DAILY
Status: DISCONTINUED | OUTPATIENT
Start: 2022-09-25 | End: 2022-09-25

## 2022-09-25 RX ORDER — NICOTINE POLACRILEX 4 MG
30 LOZENGE BUCCAL
Status: DISCONTINUED | OUTPATIENT
Start: 2022-09-25 | End: 2022-09-25

## 2022-09-25 RX ORDER — ATORVASTATIN CALCIUM 20 MG/1
20 TABLET, FILM COATED ORAL NIGHTLY
Status: DISCONTINUED | OUTPATIENT
Start: 2022-09-25 | End: 2022-09-25

## 2022-09-25 RX ORDER — CEPHALEXIN 500 MG/1
500 CAPSULE ORAL EVERY 8 HOURS SCHEDULED
Qty: 9 CAPSULE | Refills: 0 | Status: SHIPPED | OUTPATIENT
Start: 2022-09-25 | End: 2022-09-28

## 2022-09-25 RX ORDER — NICOTINE POLACRILEX 4 MG
15 LOZENGE BUCCAL
Status: DISCONTINUED | OUTPATIENT
Start: 2022-09-25 | End: 2022-09-25

## 2022-09-25 RX ORDER — SODIUM CHLORIDE 9 MG/ML
INJECTION, SOLUTION INTRAVENOUS CONTINUOUS
Status: ACTIVE | OUTPATIENT
Start: 2022-09-25 | End: 2022-09-25

## 2022-09-25 RX ORDER — MELATONIN
325
Status: DISCONTINUED | OUTPATIENT
Start: 2022-09-25 | End: 2022-09-25

## 2022-09-25 RX ORDER — DOCUSATE SODIUM 100 MG/1
100 CAPSULE, LIQUID FILLED ORAL EVERY OTHER DAY
Status: DISCONTINUED | OUTPATIENT
Start: 2022-09-25 | End: 2022-09-25

## 2022-09-25 RX ORDER — METOPROLOL TARTRATE 50 MG/1
50 TABLET, FILM COATED ORAL DAILY
Qty: 1 TABLET | Refills: 0 | Status: SHIPPED | COMMUNITY
Start: 2022-09-25

## 2022-09-25 RX ORDER — CEPHALEXIN 500 MG/1
500 CAPSULE ORAL EVERY 8 HOURS SCHEDULED
Status: DISCONTINUED | OUTPATIENT
Start: 2022-09-25 | End: 2022-09-25

## 2022-09-25 NOTE — PLAN OF CARE
Patient alert and oriented, to self, said she was at \"[facility]\", thought it was December 1, 22; vital signs stable; cardiac monitor showing atrial fibrillation/flutter; lung sounds clear, on room air, no cough noted; edema to BLE; incontinent of urine and bowel with last bowel movement yesterday; patient up with a max assist x2 and walker, up to chair; patient does have a small, red bump to the back/right side of her head; family at the  bedside; per Dr Jaquan Valdez, patient can be discharged back to [facility]. Problem: CARDIOVASCULAR - ADULT  Goal: Maintains optimal cardiac output and hemodynamic stability  Description: INTERVENTIONS:  - Monitor vital signs, rhythm, and trends  - Monitor for bleeding, hypotension and signs of decreased cardiac output  - Evaluate effectiveness of vasoactive medications to optimize hemodynamic stability  - Monitor arterial and/or venous puncture sites for bleeding and/or hematoma  - Assess quality of pulses, skin color and temperature  - Assess for signs of decreased coronary artery perfusion - ex.  Angina  - Evaluate fluid balance, assess for edema, trend weights  Outcome: Progressing  Goal: Absence of cardiac arrhythmias or at baseline  Description: INTERVENTIONS:  - Continuous cardiac monitoring, monitor vital signs, obtain 12 lead EKG if indicated  - Evaluate effectiveness of antiarrhythmic and heart rate control medications as ordered  - Initiate emergency measures for life threatening arrhythmias  - Monitor electrolytes and administer replacement therapy as ordered  Outcome: Progressing

## 2022-09-25 NOTE — PLAN OF CARE
Received patient from ED into 2606 around 0215, accompanied by daughter. Oriented to room, call light and safety. Oriented to self and time. Disoriented to place and situation. Afib on tele monitor. O2 sat adequate on room air. Denies chest pain and shortness of breath. Navigator complete. Incontinent of urine, purewick placed. Plan of care updated. Bed locked, in lowest position, with bed alarm on. Call light and personal items in reach. All needs met at this time.

## 2022-09-25 NOTE — PROGRESS NOTES
NURSING DISCHARGE NOTE    Discharged Nursing home via Ambulance. Accompanied by Support staff  Belongings Taken by patient/family. Pt is assessed and ready for discharge. Instructions and follow ups gone over with patients family and [address] [place] [address] assisted living. Script for Antibiotic sent to [facility] per [facility] request, family to  prior to patients arrival at facility. IV DC'd. CDI. Traveling via ambulance.

## 2022-09-25 NOTE — ED QUICK NOTES
Orders for admission, patient is aware of plan and ready to go upstairs. Any questions, please call ED RN Roberto Michael at extension [phone].      Patient Covid vaccination status: Fully vaccinated     COVID Test Ordered in ED: Rapid SARS-CoV-2 by PCR    COVID Suspicion at Admission: Low clinical suspicion for COVID    Running Infusions:      Mental Status/LOC at time of transport: AAOx2    Other pertinent information:   CIWA score: N/A   NIH score:  N/A

## 2022-09-25 NOTE — CM/SW NOTE
09/25/22 1421   Discharge disposition   Expected discharge disposition [facility]   Post Acute Care Provider   ([facility])   Discharge transportation [address]     Notified by RN pt is medically cleared for discharge and family is requesting transportation. [facility] arranged for 3:30pm. Updated RN, RN stated she has also scheduled an ambulance to come ASAP, per family's request, and the ambulance will be arriving within the next 30 minutes. Ambulance arranged for 3:30pm cancelled. ZAFAR spoke with HORACE Pryor at [facility] and notified her pt will return today. HORACE Pryor requested Dharmesh Cervantes to call for report, ZAFAR updated [facility] and provided [employer] [phone] and [phone] . PCS form completed and available for RN to print.       [facility]  [phone]    [address] [facility]  [phone]    Melissa Bustamante [facility]  Discharge Planner

## 2022-09-25 NOTE — ED INITIAL ASSESSMENT (HPI)
Patient arrived to the ED from [facility] by [facility] for c/o fall. Per EMS, patient had an unwitnessed fall in the bathroom and hit her head. Patient is on Eliqus. Complaining of head and neck pain. C-Collar in place by EMS. AAOx2.

## 2022-09-25 NOTE — ED QUICK NOTES
Pt used bedpan for urine. Pt changed into clean dry sheets, gown and warm blankets for comfort. Family at bedside. No distress.

## 2022-10-20 ENCOUNTER — LAB REQUISITION (OUTPATIENT)
Dept: LAB | Facility: HOSPITAL | Age: 87
End: 2022-10-20
Payer: MEDICARE

## 2022-10-20 DIAGNOSIS — I10 ESSENTIAL (PRIMARY) HYPERTENSION: ICD-10-CM

## 2022-10-20 DIAGNOSIS — E11.40 TYPE 2 DIABETES MELLITUS WITH DIABETIC NEUROPATHY, UNSPECIFIED (HCC): ICD-10-CM

## 2022-10-20 DIAGNOSIS — N18.9 CHRONIC KIDNEY DISEASE, UNSPECIFIED: ICD-10-CM

## 2022-10-20 LAB
ALBUMIN SERPL-MCNC: 2.9 G/DL (ref 3.4–5)
ALBUMIN/GLOB SERPL: 0.8 {RATIO} (ref 1–2)
ALP LIVER SERPL-CCNC: 105 U/L
ALT SERPL-CCNC: 18 U/L
ANION GAP SERPL CALC-SCNC: 6 MMOL/L (ref 0–18)
AST SERPL-CCNC: 16 U/L (ref 15–37)
BASOPHILS # BLD AUTO: 0.05 X10(3) UL (ref 0–0.2)
BASOPHILS NFR BLD AUTO: 0.7 %
BILIRUB SERPL-MCNC: 0.7 MG/DL (ref 0.1–2)
BUN BLD-MCNC: 21 MG/DL (ref 7–18)
CALCIUM BLD-MCNC: 9 MG/DL (ref 8.5–10.1)
CHLORIDE SERPL-SCNC: 105 MMOL/L (ref 98–112)
CHOLEST SERPL-MCNC: 120 MG/DL (ref ?–200)
CO2 SERPL-SCNC: 24 MMOL/L (ref 21–32)
CREAT BLD-MCNC: 0.85 MG/DL
EOSINOPHIL # BLD AUTO: 0.19 X10(3) UL (ref 0–0.7)
EOSINOPHIL NFR BLD AUTO: 2.6 %
ERYTHROCYTE [DISTWIDTH] IN BLOOD BY AUTOMATED COUNT: 14.7 %
EST. AVERAGE GLUCOSE BLD GHB EST-MCNC: 183 MG/DL (ref 68–126)
FOLATE SERPL-MCNC: 13.2 NG/ML (ref 8.7–?)
GFR SERPLBLD BASED ON 1.73 SQ M-ARVRAT: 65 ML/MIN/1.73M2 (ref 60–?)
GLOBULIN PLAS-MCNC: 3.7 G/DL (ref 2.8–4.4)
GLUCOSE BLD-MCNC: 273 MG/DL (ref 70–99)
HBA1C MFR BLD: 8 % (ref ?–5.7)
HCT VFR BLD AUTO: 36.1 %
HDLC SERPL-MCNC: 53 MG/DL (ref 40–59)
HGB BLD-MCNC: 12.4 G/DL
IMM GRANULOCYTES # BLD AUTO: 0.16 X10(3) UL (ref 0–1)
IMM GRANULOCYTES NFR BLD: 2.2 %
LDLC SERPL CALC-MCNC: 46 MG/DL (ref ?–100)
LYMPHOCYTES # BLD AUTO: 1.4 X10(3) UL (ref 1–4)
LYMPHOCYTES NFR BLD AUTO: 18.9 %
MCH RBC QN AUTO: 34.3 PG (ref 26–34)
MCHC RBC AUTO-ENTMCNC: 34.3 G/DL (ref 31–37)
MCV RBC AUTO: 100 FL
MONOCYTES # BLD AUTO: 0.74 X10(3) UL (ref 0.1–1)
MONOCYTES NFR BLD AUTO: 10 %
NEUTROPHILS # BLD AUTO: 4.88 X10 (3) UL (ref 1.5–7.7)
NEUTROPHILS # BLD AUTO: 4.88 X10(3) UL (ref 1.5–7.7)
NEUTROPHILS NFR BLD AUTO: 65.6 %
NONHDLC SERPL-MCNC: 67 MG/DL (ref ?–130)
OSMOLALITY SERPL CALC.SUM OF ELEC: 293 MOSM/KG (ref 275–295)
PLATELET # BLD AUTO: 316 10(3)UL (ref 150–450)
POTASSIUM SERPL-SCNC: 4.5 MMOL/L (ref 3.5–5.1)
PROT SERPL-MCNC: 6.6 G/DL (ref 6.4–8.2)
RBC # BLD AUTO: 3.61 X10(6)UL
SODIUM SERPL-SCNC: 135 MMOL/L (ref 136–145)
TRIGL SERPL-MCNC: 116 MG/DL (ref 30–149)
TSI SER-ACNC: 1.82 MIU/ML (ref 0.36–3.74)
VIT B12 SERPL-MCNC: 460 PG/ML (ref 193–986)
VIT D+METAB SERPL-MCNC: 35.4 NG/ML (ref 30–100)
VLDLC SERPL CALC-MCNC: 16 MG/DL (ref 0–30)
WBC # BLD AUTO: 7.4 X10(3) UL (ref 4–11)

## 2022-10-20 PROCEDURE — 80061 LIPID PANEL: CPT | Performed by: INTERNAL MEDICINE

## 2022-10-20 PROCEDURE — 84443 ASSAY THYROID STIM HORMONE: CPT | Performed by: INTERNAL MEDICINE

## 2022-10-20 PROCEDURE — 85025 COMPLETE CBC W/AUTO DIFF WBC: CPT | Performed by: INTERNAL MEDICINE

## 2022-10-20 PROCEDURE — 80053 COMPREHEN METABOLIC PANEL: CPT | Performed by: INTERNAL MEDICINE

## 2022-10-20 PROCEDURE — 83036 HEMOGLOBIN GLYCOSYLATED A1C: CPT | Performed by: INTERNAL MEDICINE

## 2022-10-20 PROCEDURE — 82746 ASSAY OF FOLIC ACID SERUM: CPT | Performed by: INTERNAL MEDICINE

## 2022-10-20 PROCEDURE — 82607 VITAMIN B-12: CPT | Performed by: INTERNAL MEDICINE

## 2022-10-20 PROCEDURE — 82306 VITAMIN D 25 HYDROXY: CPT | Performed by: INTERNAL MEDICINE

## 2022-11-28 RX ORDER — LIDOCAINE HYDROCHLORIDE 20 MG/ML
SOLUTION ORAL; TOPICAL
Qty: 90 TABLET | Refills: 3 | Status: SHIPPED | OUTPATIENT
Start: 2022-11-28

## (undated) NOTE — IP AVS SNAPSHOT
Patient Demographics     Address  [address] [facility] [address] Phone  [phone] [facility])  [phone] (Mobile) *Preferred* E-mail Address  [email or web address]      Emergency Contact(s)     Name Relation Home Work Mobile    Narinder Bliss DULoxetine HCl 30 MG Cpep  Commonly known as: CYMBALTA  Next dose due: 2/24: tomorrow AM      Take 30 mg by mouth daily.           ferrous sulfate 325 (65 FE) MG Tbec  Next dose due: 2/24: tomorrow AM      Take 325 mg by mouth daily with breakfast. These medications were sent to [place] [address] [address] [facility], [address] [place] [address], [phone], [phone] [address] [address]    Phone: [phone]   dilTIAZem HCl ER Beads 120 MG Cp24  Metoprolol Ta Order ID Medication Name Action Time Action Reason Comments    [id number] Insulin Aspart Pen (NOVOLOG) 100 UNIT/ML flexpen 1-10 Units 02/22/21 1236 Given            RIGHT UPPER ABDOMEN     Order ID Medication Name Action Time Action Reason Comments    7223 PT 22.6 12.4 - 14.6 seconds [facility])   INR 1.93 0.89 - 1.11 [facility])            Testing Performed By     Lab - Abbreviation Name Director Address Valid Date Range    [address] [facility]) [address] [facility] ([facility]) Go History of Present Illness: Catalina Deleon is a 80year old female with a past medical history of HTN, HLD, OA, DM2. She was getting up from the toilet and was sitting back into her wheelchair when she fell as the wheels were not locked.   She landed •  metFORMIN HCl 500 MG Oral Tab, Take 1 tablet (500 mg total) by mouth 2 (two) times daily with meals. , Disp: 180 tablet, Rfl: 1    •  melatonin 3 MG Oral Tab, TAKE 2 TABLETS BY MOUTH AT BEDTIME, Disp: 120 tablet, Rfl: 0    •  FEROSUL 325 (65 Fe) MG Oral •  Glucose Blood (PRODIGY NO CODING BLOOD GLUC) In Vitro Strip, USE 1 STRIP QD., Disp: , Rfl: 3    •  denosumab (PROLIA) 60 MG/ML Subcutaneous Solution, Inject 60 mg into the skin., Disp: , Rfl:     •  metroNIDAZOLE 0.75 % External Gel, Apply 1 Application Lab 02/17/21  1111   TROP <0.045       Imaging: Imaging data reviewed in Epic. ASSESSMENT / PLAN:     1. A. Flutter  1. On cardizem drip  2. Start BB  3. Echo  4. TSH (though pt on biotin as OP)  2. HLD  3. OA  4. DM2  1. insulin    Dispo: as above.  c 22-year-old female with a past medical history as below who presents to the ER after she suffered a fall at home. She notes that she was in her bathroom when she was sitting back to sit in her wheelchair.   The wheelchair was not locked and she fell on her ROS 10 systems reviewed, pertinent findings above.   ROS    History:    - HTN  - Dyslipidemia  - OA      Past Medical History:   Diagnosis Date   • BCC, SCC, S/P SKIN FLAP ON FACE 7/11/2008   • HYPERLIPIDEMIA 7/11/2008   • HYPERTENSION 7/11/2008   • [facility] Physical Therapy Notes (last 72 hours) (Notes from 2/20/2021 12:30 PM through 2/23/2021 12:30 PM)      Physical Therapy Note signed by Ani Jauregui PT at 2/22/2021  4:50 PM  Version 1 of 1    Author: Ani Jauregui PT Service: Rehab Author Type: Physical The Precautions: Bed/chair alarm    WEIGHT BEARING RESTRICTION  Weight Bearing Restriction: None                PAIN ASSESSMENT   Ratin  Location: L shoulder  Management Techniques:  Activity promotion;Relaxation;Repositioning    BALANCE Skilled Therapy Provided:[EH.1] Per RN okay to work with pt. Pt received up in chair and was agreeable to PT session. Pt and pt's dtr educated on role of therapy, goals for the session, fall prevention, and discharge planning.  Thorough discussion regarding Pt seen this PM for transfer training and strengthening. Pt showed improved tolerance for activity. Pt required less assist for sit to stand transfers and for ambulation from bed to chair.  Pt continues with some confusion but was oriented to time, place, a Duplicate order noted. Will continue to follow pt. Pt has follow up date for PT of 2/22/21. Will re-assess at that time. Thank you.[KB.1]     Attribution Key    KB. 1 - Yareli So, PT on 2/21/2021  2:51 PM                     Occupational Therapy Notes

## (undated) NOTE — IP AVS SNAPSHOT
[address]            (For Outpatient Use Only) Initial Admit Date: 2022   Inpt/Obs Admit Date: Inpt: 22 / Obs: N/A   Discharge Date:    Magdalena Lanes:  [de-identified]   MRN: [de-identified]   CSN: [id number]   CEID: [id number]        ENCOUNTER  Patient Class: Inpatient Admitting Provider: Mohit Singer MD Unit: [place] [address] Service: Cardiac Telemetry Attending Provider: Mohit Singer MD   Bed: 8606-Y   Visit Type:   Referring Physician: No ref. provider found Billing Flag:    Admit Diagnosis: Syncope and collapse [R55]      PATIENT  Legal Name:   Brenna Runner   Legal Sex: Female  Gender ID: Female             Pref Name:   Geary Apley PCP:  Shawnee Rainey MD Home: [phone]   Address:  [address] [place], [place] [address] : 10/30/1931 (89 yrs) Mobile: [phone]         City/State/Zip: [address] [place] [address], [address] [place] [address] Marital:  Language: Margaret gale: Amy SSN4: [id number] Jain: [facility]     Race: White Ethnicity: Non  Or [address] [place] [address] Street   Name Relationship Legal Guardian? Home Phone Work Phone Mobile Phone   1. JermaindeysiSri  2.  [facility] Daughter  Daughter    [phone]     GUARANTOR  Guarantor: Brenna Runner : 10/30/1931 Home Phone: [phone]   Address: [facility]  Sex: Female Work Phone:    City/State/Zip: [address] [place] [address], [address] [place] [address]   Rel. to Patient: Self Guarantor ID: [id number]   GUARANTOR EMPLOYER   Employer:  Status: RETIRED     COVERAGE  PRIMARY INSURANCE   Payor: MEDICARE Plan: MEDICARE PART A&B   Group Number:  Insurance Type: INDEMNITY   Subscriber Name: Lorelei Cruz : 10/30/1931   Subscriber ID: [id number] Pt Rel to Subscriber: Self   SECONDARY INSURANCE   Payor: [facility] INDEMNITY Plan: [address] [place] [address]   Group Number: [id number] Insurance Type: [id number] Name: Lorelei [place] : 10/30/1931   Subscriber ID: [id number] Pt Rel to Subscriber: SELF   TERTIARY INSURANCE   Payor:  Plan:    Group Number:  Insurance Type:    Subscriber Name:  Subscriber :    Subscriber ID:  Pt Rel to Subscriber:    Hospital Account Financial Class: Medicare    2022

## (undated) NOTE — IP AVS SNAPSHOT
[address]            (For Outpatient Use Only) Initial Admit Date: 2/17/2021   Inpt/Obs Admit Date: Inpt: N/A / Obs: 02/17/21   Discharge Date:    Sis Rahman:  [de-identified]   MRN: [de-identified]   CSN: [id number]   CEID: [id number] Subscriber ID: [id number] Pt Rel to Subscriber: SELF   TERTIARY INSURANCE   Payor:  Plan:    Group Number:  Insurance Type:    Subscriber Name:  Subscriber :    Subscriber ID:  Pt Rel to Subscriber:    Hospital Account Financial Class: Medicare    Feb

## (undated) NOTE — ED AVS SNAPSHOT
Alexei Bae   MRN: [id number]    Department:  [facility] Emergency Department   Date of Visit:  4/22/2019           Disclosure     Insurance plans vary and the physician(s) referred by the ER may not be covered by your plan.  Please contact tell this physician (or your personal doctor if your instructions are to return to your personal doctor) about any new or lasting problems. The primary care or specialist physician will see patients referred from the [facility] Emergency Department.  Luis Alfredo Maxwell